# Patient Record
Sex: MALE | Race: WHITE | Employment: OTHER | URBAN - METROPOLITAN AREA
[De-identification: names, ages, dates, MRNs, and addresses within clinical notes are randomized per-mention and may not be internally consistent; named-entity substitution may affect disease eponyms.]

---

## 2017-02-18 DIAGNOSIS — E78.5 HYPERLIPIDEMIA LDL GOAL <130: ICD-10-CM

## 2017-02-19 RX ORDER — SIMVASTATIN 20 MG/1
TABLET, FILM COATED ORAL
Qty: 90 TAB | Refills: 1 | Status: SHIPPED | OUTPATIENT
Start: 2017-02-19 | End: 2017-08-30 | Stop reason: SDUPTHER

## 2017-08-28 ENCOUNTER — TELEPHONE (OUTPATIENT)
Dept: FAMILY MEDICINE CLINIC | Age: 47
End: 2017-08-28

## 2017-08-28 NOTE — TELEPHONE ENCOUNTER
Rush Pedersen [facility]  [phone]    Patient's wife, Rush Pedersen, states that her  has been having shooting pains in his left arm. She states that this has been going on for months. Initially, they just thought the pain was due to him working out a lot but now it is more concerning. Rush Pedersen also states that her  has been having a lot of issues with anxiety and she feels that he needs to talk to Dr. Pipo Couch. Rush Pedersen declined scheduling with another provider and asked if Dr. Pipo Couch could see him any sooner than October?

## 2017-08-30 ENCOUNTER — OFFICE VISIT (OUTPATIENT)
Dept: FAMILY MEDICINE CLINIC | Age: 47
End: 2017-08-30

## 2017-08-30 VITALS
DIASTOLIC BLOOD PRESSURE: 106 MMHG | TEMPERATURE: 98.4 F | WEIGHT: 152 LBS | HEIGHT: 66 IN | BODY MASS INDEX: 24.43 KG/M2 | HEART RATE: 75 BPM | SYSTOLIC BLOOD PRESSURE: 132 MMHG | RESPIRATION RATE: 16 BRPM | OXYGEN SATURATION: 98 %

## 2017-08-30 DIAGNOSIS — R53.81 MALAISE AND FATIGUE: ICD-10-CM

## 2017-08-30 DIAGNOSIS — M25.511 ACUTE PAIN OF BOTH SHOULDERS: ICD-10-CM

## 2017-08-30 DIAGNOSIS — R53.83 MALAISE AND FATIGUE: ICD-10-CM

## 2017-08-30 DIAGNOSIS — E78.5 HYPERLIPIDEMIA LDL GOAL <130: ICD-10-CM

## 2017-08-30 DIAGNOSIS — F41.9 ANXIETY: Primary | ICD-10-CM

## 2017-08-30 DIAGNOSIS — M25.512 ACUTE PAIN OF BOTH SHOULDERS: ICD-10-CM

## 2017-08-30 DIAGNOSIS — R03.0 ELEVATED BLOOD PRESSURE READING: ICD-10-CM

## 2017-08-30 RX ORDER — ESCITALOPRAM OXALATE 10 MG/1
10 TABLET ORAL DAILY
Qty: 30 TAB | Refills: 5 | Status: SHIPPED | OUTPATIENT
Start: 2017-08-30 | End: 2018-03-09 | Stop reason: SDUPTHER

## 2017-08-30 RX ORDER — ALPRAZOLAM 0.25 MG/1
0.25 TABLET ORAL
Qty: 20 TAB | Refills: 0 | Status: SHIPPED | OUTPATIENT
Start: 2017-08-30 | End: 2017-12-06 | Stop reason: ALTCHOICE

## 2017-08-30 NOTE — PROGRESS NOTES
Chief Complaint   Patient presents with    Shoulder Pain     bilateral, sometimes sharp radiates to arm no numbness or tingling    Anxiety     new onset     Not sleeping, cannot relax, denies hyperventilating (panic attack last week x 2-3 hours), unexplained emotions  Took a xanax did not work-minimal relief    DEEPAK-7 score: 14    Called in rx xanax local pharmacy

## 2017-08-30 NOTE — PROGRESS NOTES
HISTORY OF PRESENT ILLNESS  Darryl Chaparro is a 49 y.o. male. Blood pressure (!) 132/106, pulse 75, temperature 98.4 °F (36.9 °C), temperature source Oral, resp. rate 16, height 5' 6\" (1.676 m), weight 152 lb (68.9 kg), SpO2 98 %. Body mass index is 24.53 kg/(m^2). Chief Complaint   Patient presents with    Shoulder Pain     bilateral, sometimes sharp radiates to arm no numbness or tingling    Anxiety     new onset        HPI  Darryl Chaparro 49 y.o. male  presents to the office today for follow up on anxiety. Elevated Blood Pressure: Bp at office today is 142/92 on manual arm cuff recheck. Pt states that he has been having some anxiety issues and thinks that that is causing his bp to be high in office. Anxiety: Pt states that work is going well, but when he gets home he wants to relax, but then gets figidity. Pt went on vacation recently and pt thinks that this aggravated his anxiety. Pt's wife reports that pt called her and said he felt like he was going to cry and could not sit still at night. Pt states that he took a Xanax that night and it provided some relief. Pt states that he is constantly thinking. Pt states that he feels better when someone is watching him and his family. Pt states that after the vacation he felt like he was not going to catch up on his work. Wife reports family history of panic attacks and anxiety. I advised pt that if it is intermittent episodes then it could be panic attacks, but if it is a constant thing then is could be anxiety. I advised pt that there are some daily medications he could take to help alleviate the anxiety. I advised pt that I wanted to get more labs to rule out any thyroid abnormalities. Pt's wife reports that pt's mother had thyroid problems. Wife reports that pt's brother has used Lexapro with relief. I advised pt that I will start him on low doses of Lexapro daily and Xanax PRN.        Bilateral Shoulder Pain: Pt reports that he has had bilateral shoulder pain for the past 6 months. Denies cp, sob, sweats, or radiation. Pt describes the pain as a shooting pain. Pt reports that the pain is worse on the left than the right. I advised pt to see Dr. Thomes Ahumada (Sports Medicine) to rule out acromialclavicular arthritis or anything abnormal. I will get an xray of the shoulders today to rule out arthritis. Right lower back/ hip: Pt reports that he feels something in his rt hip/ lower back area that sometimes causes pain to radiate down his RLE. I advised pt that it could be benign adipose tumor that can be pinching a nerve that is causing the radiating pain down his leg. Hyperlipidemia: Lipid panel on 07/07/16 notable for total cholesterol 256, , HDL 56, and triglycerides 106. Pt continues with Zocor. Wife reports that pt has been taking his medication regularly. I advised pt that he needs to get his lipids rechecked. Current Outpatient Prescriptions   Medication Sig Dispense Refill    escitalopram oxalate (LEXAPRO) 10 mg tablet Take 1 Tab by mouth daily. 30 Tab 5    ALPRAZolam (XANAX) 0.25 mg tablet Take 1 Tab by mouth daily as needed for Anxiety. 20 Tab 0    simvastatin (ZOCOR) 20 mg tablet TAKE 1 TABLET BY MOUTH NIGHTLY 90 Tab 1    diphenhydrAMINE (BENADRYL) 25 mg capsule Take 25 mg by mouth every six (6) hours as needed.  multivitamin (ONE A DAY) tablet Take 1 Tab by mouth daily.  desoximetasone (TOPICORT) 0.05 % topical cream Apply  to affected area two (2) times a day.  60 g 0     Allergies   Allergen Reactions    Shellfish Derived Swelling     Lips swell     Past Medical History:   Diagnosis Date    Anxiety     Elevated cholesterol 4/5/2010    Migraine 4/5/2010    Right inguinal hernia 12/7/2014     Past Surgical History:   Procedure Laterality Date    ENDOSCOPY, COLON, DIAGNOSTIC  4/06 repeat in 5 years    Dr. Pj Alonso     Family History   Problem Relation Age of Onset    Cancer Father      colon     Social History Substance Use Topics    Smoking status: Former Smoker     Packs/day: 0.50     Years: 2.00     Quit date: 4/26/1993    Smokeless tobacco: Never Used    Alcohol use 2.4 oz/week     4 Cans of beer per week        Review of Systems   Constitutional: Negative. Negative for malaise/fatigue. Eyes: Negative for blurred vision. Respiratory: Negative for shortness of breath. Cardiovascular: Negative for chest pain and leg swelling. Musculoskeletal: Positive for joint pain (bilateral shoulder pain). Neurological: Negative. Negative for dizziness and headaches. Psychiatric/Behavioral: The patient is nervous/anxious. All other systems reviewed and are negative. Physical Exam   Constitutional: He is oriented to person, place, and time. He appears well-developed and well-nourished. HENT:   Head: Normocephalic and atraumatic. Neck: Carotid bruit is not present. Cardiovascular: Normal rate, regular rhythm, normal heart sounds and intact distal pulses. Exam reveals no gallop and no friction rub. No murmur heard. Pulmonary/Chest: Effort normal and breath sounds normal. No respiratory distress. He has no wheezes. He has no rales. He exhibits no tenderness. Neurological: He is alert and oriented to person, place, and time. Nursing note and vitals reviewed. ASSESSMENT and PLAN  Diagnoses and all orders for this visit:    1. Anxiety  -     escitalopram oxalate (LEXAPRO) 10 mg tablet; Take 1 Tab by mouth daily.  -     ALPRAZolam (XANAX) 0.25 mg tablet; Take 1 Tab by mouth daily as needed for Anxiety. - I advised pt that I will start him on Lexapro 10mg daily, but not for the long term. - I advised pt to also take Xanax 0.25mg PRN. 2. Acute pain of both shoulders  -     XR SHOULDER RT AP/LAT MIN 2 V; Future  -     XR SHOULDER LT AP/LAT MIN 2 V; Future  - I will get xray of both shoulders to rule out arthritis.      3. Elevated blood pressure reading        - I advised pt that if he gets his anxiety under control his blood pressure should go down. 4. Hyperlipidemia LDL goal <226  -     METABOLIC PANEL, COMPREHENSIVE  -     LIPID PANEL  - Pt's last lipid panel was over a year ago. - I advised pt that he needs to get these checked again tomorrow morning after fasting. 5. Malaise and fatigue  -     TSH 3RD GENERATION  -     CBC WITH AUTOMATED DIFF  - Will assess thyroid today to rule out any hormonal causes of anxiety. Follow-up Disposition:  Return in about 1 month (around 9/30/2017) for anxiety. Medication risks/benefits/costs/interactions/alternatives discussed with patient. Advised patient to call back or return to office if symptoms worsen/change/persist.  If patient cannot reach us or should anything more severe/urgent arise he/she should proceed directly to the nearest emergency department. Discussed expected course/resolution/complications of diagnosis in detail with patient. Patient given a written after visit summary which includes her diagnoses, current medications and vitals. Patient expressed understanding with the diagnosis and plan. Written by josi Kahn, as dictated by Kristin Adams M.D.   I have reviewed and agree with the above note and have made corrections where appropriate, Dr. Cari Alonso MD

## 2017-08-30 NOTE — MR AVS SNAPSHOT
Visit Information Date & Time Provider Department Dept. Phone Encounter #  
 8/30/2017  6:45 PM Mattie Goodwin  [facility] [phone] [id number] Follow-up Instructions Return in about 1 month (around 9/30/2017) for anxiety. Upcoming Health Maintenance Date Due INFLUENZA AGE 9 TO ADULT 8/1/2017 DTaP/Tdap/Td series (2 - Td) 4/26/2020 Allergies as of 8/30/2017  Review Complete On: 8/30/2017 By: Liset Nixon LPN Severity Noted Reaction Type Reactions Shellfish Derived  12/01/2014    Swelling Lips swell Current Immunizations  Reviewed on 11/9/2014 Name Date Influenza Vaccine Split 10/4/2011 TDAP Vaccine 4/26/2010 Not reviewed this visit You Were Diagnosed With   
  
 Codes Comments Anxiety    -  Primary ICD-10-CM: F41.9 ICD-9-CM: 300.00 Acute pain of both shoulders     ICD-10-CM: M25.511, M25.512 ICD-9-CM: 719.41 Elevated blood pressure reading     ICD-10-CM: R03.0 ICD-9-CM: 796.2 Hyperlipidemia LDL goal <130     ICD-10-CM: E78.5 ICD-9-CM: 272.4 Malaise and fatigue     ICD-10-CM: R53.81, R53.83 ICD-9-CM: 780.79 Vitals BP Pulse Temp Resp Height(growth percentile) Weight(growth percentile) (!) 132/106 (BP 1 Location: Left arm, BP Patient Position: Sitting) 75 98.4 °F (36.9 °C) (Oral) 16 5' 6\" (1.676 m) 152 lb (68.9 kg) SpO2 BMI Smoking Status 98% 24.53 kg/m2 Former Smoker Vitals History BMI and BSA Data Body Mass Index Body Surface Area 24.53 kg/m 2 1.79 m 2 Preferred Pharmacy Pharmacy Name Phone [id number] Chairez [phone] Your Updated Medication List  
  
   
This list is accurate as of: 8/30/17  8:04 PM.  Always use your most recent med list.  
  
  
  
  
 ALPRAZolam 0.25 mg tablet Commonly known as:  Phylicia Heard Take 1 Tab by mouth daily as needed for Anxiety. BENADRYL 25 mg capsule Generic drug:  diphenhydrAMINE Take 25 mg by mouth every six (6) hours as needed. desoximetasone 0.05 % topical cream  
Commonly known as:  TOPICORT Apply  to affected area two (2) times a day. escitalopram oxalate 10 mg tablet Commonly known as:  Brendan Snow Take 1 Tab by mouth daily. multivitamin tablet Commonly known as:  ONE A DAY Take 1 Tab by mouth daily. simvastatin 20 mg tablet Commonly known as:  ZOCOR  
TAKE 1 TABLET BY MOUTH NIGHTLY Prescriptions Printed Refills ALPRAZolam (XANAX) 0.25 mg tablet 0 Sig: Take 1 Tab by mouth daily as needed for Anxiety. Class: Print Route: Oral  
  
Prescriptions Sent to Pharmacy Refills  
 escitalopram oxalate (LEXAPRO) 10 mg tablet 5 Sig: Take 1 Tab by mouth daily. Class: Normal  
 [facility]: [facility] #: [phone] Route: Oral  
  
We Performed the Following CBC WITH AUTOMATED DIFF [39334 CPT(R)] LIPID PANEL [65677 CPT(R)] METABOLIC PANEL, COMPREHENSIVE [28199 CPT(R)] TSH 3RD GENERATION [56984 CPT(R)] Follow-up Instructions Return in about 1 month (around 9/30/2017) for anxiety. To-Do List   
 08/30/2017 Imaging:  XR SHOULDER LT AP/LAT MIN 2 V   
  
 08/30/2017 Imaging:  XR SHOULDER RT AP/LAT MIN 2 V   
  
 08/30/2017 8:05 PM  
  Appointment with [facility] RAD XR RM 1 at [address] [place] [address] ([phone]) Introducing [facility]! [facility] introduces [employer] patient portal. Now you can access parts of your medical record, email your doctor's office, and request medication refills online. 1. In your internet browser, go to [email or web address] [facility] 2. Click on the First Time User? Click Here link in the Sign In box.  You will see the New Member Sign Up page. 3. Enter your [employer] Access Code exactly as it appears below. You will not need to use this code after youve completed the sign-up process. If you do not sign up before the expiration date, you must request a new code. · [employer] Access Code: [email or web address] Expires: 11/28/2017  8:04 PM 
 
4. Enter the last four digits of your Social Security Number (xxxx) and Date of Birth (mm/dd/yyyy) as indicated and click Submit. You will be taken to the next sign-up page. 5. Create a [employer] ID. This will be your [employer] login ID and cannot be changed, so think of one that is secure and easy to remember. 6. Create a [employer] password. You can change your password at any time. 7. Enter your Password Reset Question and Answer. This can be used at a later time if you forget your password. 8. Enter your e-mail address. You will receive e-mail notification when new information is available in [address]. 9. Click Sign Up. You can now view and download portions of your medical record. 10. Click the Download Summary menu link to download a portable copy of your medical information. If you have questions, please visit the Frequently Asked Questions section of the [employer] website. Remember, [employer] is NOT to be used for urgent needs. For medical emergencies, dial 911. Now available from your iPhone and Android! Please provide this summary of care documentation to your next provider. Your primary care clinician is listed as Domingo Bo. If you have any questions after today's visit, please call [phone].

## 2017-08-31 LAB
ALBUMIN SERPL-MCNC: 4.5 G/DL (ref 3.5–5.5)
ALBUMIN/GLOB SERPL: 1.6 {RATIO} (ref 1.2–2.2)
ALP SERPL-CCNC: 56 IU/L (ref 39–117)
ALT SERPL-CCNC: 29 IU/L (ref 0–44)
AST SERPL-CCNC: 27 IU/L (ref 0–40)
BASOPHILS # BLD AUTO: 0 X10E3/UL (ref 0–0.2)
BASOPHILS NFR BLD AUTO: 0 %
BILIRUB SERPL-MCNC: 0.2 MG/DL (ref 0–1.2)
BUN SERPL-MCNC: 18 MG/DL (ref 6–24)
BUN/CREAT SERPL: 19 (ref 9–20)
CALCIUM SERPL-MCNC: 9.5 MG/DL (ref 8.7–10.2)
CHLORIDE SERPL-SCNC: 99 MMOL/L (ref 96–106)
CHOLEST SERPL-MCNC: 176 MG/DL (ref 100–199)
CO2 SERPL-SCNC: 26 MMOL/L (ref 18–29)
CREAT SERPL-MCNC: 0.97 MG/DL (ref 0.76–1.27)
EOSINOPHIL # BLD AUTO: 0.3 X10E3/UL (ref 0–0.4)
EOSINOPHIL NFR BLD AUTO: 4 %
ERYTHROCYTE [DISTWIDTH] IN BLOOD BY AUTOMATED COUNT: 12.5 % (ref 12.3–15.4)
GLOBULIN SER CALC-MCNC: 2.9 G/DL (ref 1.5–4.5)
GLUCOSE SERPL-MCNC: 77 MG/DL (ref 65–99)
HCT VFR BLD AUTO: 43.1 % (ref 37.5–51)
HDLC SERPL-MCNC: 41 MG/DL
HGB BLD-MCNC: 14.7 G/DL (ref 12.6–17.7)
IMM GRANULOCYTES # BLD: 0 X10E3/UL (ref 0–0.1)
IMM GRANULOCYTES NFR BLD: 0 %
INTERPRETATION, 910389: NORMAL
LDLC SERPL CALC-MCNC: 79 MG/DL (ref 0–99)
LYMPHOCYTES # BLD AUTO: 2.8 X10E3/UL (ref 0.7–3.1)
LYMPHOCYTES NFR BLD AUTO: 30 %
MCH RBC QN AUTO: 30.9 PG (ref 26.6–33)
MCHC RBC AUTO-ENTMCNC: 34.1 G/DL (ref 31.5–35.7)
MCV RBC AUTO: 91 FL (ref 79–97)
MONOCYTES # BLD AUTO: 0.9 X10E3/UL (ref 0.1–0.9)
MONOCYTES NFR BLD AUTO: 9 %
NEUTROPHILS # BLD AUTO: 5.4 X10E3/UL (ref 1.4–7)
NEUTROPHILS NFR BLD AUTO: 57 %
PLATELET # BLD AUTO: 282 X10E3/UL (ref 150–379)
POTASSIUM SERPL-SCNC: 4.2 MMOL/L (ref 3.5–5.2)
PROT SERPL-MCNC: 7.4 G/DL (ref 6–8.5)
RBC # BLD AUTO: 4.76 X10E6/UL (ref 4.14–5.8)
SODIUM SERPL-SCNC: 142 MMOL/L (ref 134–144)
TRIGL SERPL-MCNC: 278 MG/DL (ref 0–149)
TSH SERPL DL<=0.005 MIU/L-ACNC: 5.53 UIU/ML (ref 0.45–4.5)
VLDLC SERPL CALC-MCNC: 56 MG/DL (ref 5–40)
WBC # BLD AUTO: 9.5 X10E3/UL (ref 3.4–10.8)

## 2017-09-04 RX ORDER — SIMVASTATIN 20 MG/1
TABLET, FILM COATED ORAL
Qty: 90 TAB | Refills: 1 | Status: SHIPPED | OUTPATIENT
Start: 2017-09-04 | End: 2018-03-08 | Stop reason: SDUPTHER

## 2017-09-15 ENCOUNTER — TELEPHONE (OUTPATIENT)
Dept: FAMILY MEDICINE CLINIC | Age: 47
End: 2017-09-15

## 2017-09-15 NOTE — TELEPHONE ENCOUNTER
Patients wife Lesley Horton is calling in regards to a missed call from [place] [facility], tried contacting nurses, no success.   Best call back # for [phone]

## 2017-09-25 NOTE — PROGRESS NOTES
We need to recheck TSH next week  Place order  And Dx malaise. Pt was slightly hypothyroid but need to confirm. Thyroid indicates elevated triglyderides need to work on diet.     Please inform about importance about getting glabs next week

## 2017-09-26 NOTE — PROGRESS NOTES
[phone] ([place]) attempted to call patient no answer left message to call us back in regards to his labs

## 2017-10-06 NOTE — PROGRESS NOTES
[phone] (M) attempted to call patient no answer left message to call me back in regards to his labs

## 2017-10-09 NOTE — PROGRESS NOTES
Mail letter to patient  The results of your lab work performed in our office were abnormal and we have had difficulty reaching you by telephone. Please contact our office as soon as possible to discuss these results.

## 2017-11-29 ENCOUNTER — TELEPHONE (OUTPATIENT)
Dept: FAMILY MEDICINE CLINIC | Age: 47
End: 2017-11-29

## 2017-11-29 NOTE — TELEPHONE ENCOUNTER
----- Message from Leticia Davila Dr sent at 11/29/2017 10:27 AM EST -----  Regarding: Dr. Shay Finn / Telephone  Contact: [phone]  Pt wife, Terrance Chaparro needs to make an appt for pt before January 2018. No appts are available for medication check up. Please contact them back.

## 2017-12-05 ENCOUNTER — TELEPHONE (OUTPATIENT)
Dept: FAMILY MEDICINE CLINIC | Age: 47
End: 2017-12-05

## 2017-12-05 DIAGNOSIS — Z12.11 COLON CANCER SCREENING: Primary | ICD-10-CM

## 2017-12-05 NOTE — TELEPHONE ENCOUNTER
Patient says she called Friday 12/1/17 and has not received a call back regarding her request. She is requesting a recommendation for a GI doctor.  Would like to a call back regarding this request.     Best contact number for patient: [phone]  12/5/17  [facility]

## 2017-12-06 ENCOUNTER — OFFICE VISIT (OUTPATIENT)
Dept: FAMILY MEDICINE CLINIC | Age: 47
End: 2017-12-06

## 2017-12-06 VITALS
DIASTOLIC BLOOD PRESSURE: 105 MMHG | RESPIRATION RATE: 18 BRPM | HEART RATE: 67 BPM | TEMPERATURE: 99.6 F | OXYGEN SATURATION: 100 % | BODY MASS INDEX: 22.73 KG/M2 | SYSTOLIC BLOOD PRESSURE: 157 MMHG | WEIGHT: 141.4 LBS | HEIGHT: 66 IN

## 2017-12-06 DIAGNOSIS — K21.9 GASTROESOPHAGEAL REFLUX DISEASE WITHOUT ESOPHAGITIS: Primary | ICD-10-CM

## 2017-12-06 DIAGNOSIS — Z13.29 THYROID DISORDER SCREEN: ICD-10-CM

## 2017-12-06 DIAGNOSIS — R53.81 MALAISE AND FATIGUE: ICD-10-CM

## 2017-12-06 DIAGNOSIS — I10 HYPERTENSION, UNSPECIFIED TYPE: ICD-10-CM

## 2017-12-06 DIAGNOSIS — E78.5 HYPERLIPIDEMIA LDL GOAL <130: ICD-10-CM

## 2017-12-06 DIAGNOSIS — F41.9 ANXIETY: ICD-10-CM

## 2017-12-06 DIAGNOSIS — R53.83 MALAISE AND FATIGUE: ICD-10-CM

## 2017-12-06 RX ORDER — CLONAZEPAM 1 MG/1
1 TABLET ORAL
Qty: 20 TAB | Refills: 0 | Status: SHIPPED | OUTPATIENT
Start: 2017-12-06 | End: 2017-12-20 | Stop reason: ALTCHOICE

## 2017-12-06 RX ORDER — PANTOPRAZOLE SODIUM 40 MG/1
40 TABLET, DELAYED RELEASE ORAL DAILY
Qty: 30 TAB | Refills: 5 | Status: SHIPPED | OUTPATIENT
Start: 2017-12-06 | End: 2018-02-10 | Stop reason: SDUPTHER

## 2017-12-06 RX ORDER — FAMOTIDINE 20 MG/1
TABLET, FILM COATED ORAL
COMMUNITY
Start: 2017-12-03 | End: 2017-12-06 | Stop reason: ALTCHOICE

## 2017-12-06 NOTE — PROGRESS NOTES
Chief Complaint   Patient presents with    Medication Evaluation   1. Have you been to the ER, urgent care clinic since your last visit? Hospitalized since your last visit? Yes When: [address], 11/2/17 panic attack   11/1/17 [facility], panic attack   2. Have you seen or consulted any other health care providers outside of the [address] [place] [address] since your last visit? Include any pap smears or colon screening. No      Patient presents for OV today,. Following urgent care and ED visit of weekend.  Had c/o over weekend of nausea, stomach burning, dx of panic attacks

## 2017-12-06 NOTE — MR AVS SNAPSHOT
Visit Information Date & Time Provider Department Dept. Phone Encounter #  
 12/6/2017  6:45 PM Wilbur Bell MD [address] [place] [address] [phone] [id number] Follow-up Instructions Return in about 2 weeks (around 12/20/2017) for hypertension follow up. Upcoming Health Maintenance Date Due DTaP/Tdap/Td series (2 - Td) 4/26/2020 Allergies as of 12/6/2017  Review Complete On: 12/6/2017 By: Wilbur Bell MD  
  
 Severity Noted Reaction Type Reactions Shellfish Derived  12/01/2014    Swelling Lips swell Current Immunizations  Reviewed on 12/6/2017 Name Date Influenza Vaccine 10/23/2017 Influenza Vaccine Split 10/4/2011 TDAP Vaccine 4/26/2010 Reviewed by Wilbur Bell MD on 12/6/2017 at  7:43 PM  
You Were Diagnosed With   
  
 Codes Comments Gastroesophageal reflux disease without esophagitis    -  Primary ICD-10-CM: K21.9 ICD-9-CM: 530.81 Hyperlipidemia LDL goal <130     ICD-10-CM: E78.5 ICD-9-CM: 272.4 Hypertension, unspecified type     ICD-10-CM: I10 
ICD-9-CM: 401.9 Thyroid disorder screen     ICD-10-CM: Z13.29 ICD-9-CM: V77.0 Anxiety     ICD-10-CM: F41.9 ICD-9-CM: 300.00 Malaise and fatigue     ICD-10-CM: R53.81, R53.83 ICD-9-CM: 780.79 Vitals BP Pulse Temp Resp Height(growth percentile) Weight(growth percentile) (!) 157/105 (BP 1 Location: Right arm, BP Patient Position: Sitting) 67 99.6 °F (37.6 °C) (Oral) 18 5' 6\" (1.676 m) 141 lb 6.4 oz (64.1 kg) SpO2 BMI Smoking Status 100% 22.82 kg/m2 Former Smoker Vitals History BMI and BSA Data Body Mass Index Body Surface Area  
 22.82 kg/m 2 1.73 m 2 Preferred Pharmacy Pharmacy Name Phone [id number], [address] [phone] Your Updated Medication List  
  
   
 This list is accurate as of: 12/6/17  8:25 PM.  Always use your most recent med list.  
  
  
  
  
 BENADRYL 25 mg capsule Generic drug:  diphenhydrAMINE Take 25 mg by mouth every six (6) hours as needed. clonazePAM 1 mg tablet Commonly known as:  Mohan Brocks Take 1 Tab by mouth daily as needed. escitalopram oxalate 10 mg tablet Commonly known as:  Graciela Skates Take 1 Tab by mouth daily. FLUCELVAX QUAD 4476-8000 (PF) Syrg injection Generic drug:  influenza vaccine 2017-18 (4 yrs+)(PF)  
INJECT 0.5ML INTRAMUSCULARLY ONCE  
  
 multivitamin tablet Commonly known as:  ONE A DAY Take 1 Tab by mouth daily. pantoprazole 40 mg tablet Commonly known as:  PROTONIX Take 1 Tab by mouth daily. simvastatin 20 mg tablet Commonly known as:  ZOCOR  
TAKE 1 TABLET BY MOUTH NIGHTLY Prescriptions Printed Refills  
 clonazePAM (KLONOPIN) 1 mg tablet 0 Sig: Take 1 Tab by mouth daily as needed. Class: Print Route: Oral  
  
Prescriptions Sent to Pharmacy Refills  
 pantoprazole (PROTONIX) 40 mg tablet 5 Sig: Take 1 Tab by mouth daily. Class: Normal  
 Pharmacy: [facility] #: [phone] Route: Oral  
  
We Performed the Following CBC WITH AUTOMATED DIFF [43261 CPT(R)] LIPID PANEL [23123 CPT(R)] T4, FREE [id number] CPT(R)] TSH 3RD GENERATION [83516 CPT(R)] Follow-up Instructions Return in about 2 weeks (around 12/20/2017) for hypertension follow up. Patient Instructions Anxiety Disorder: Care Instructions Your Care Instructions Anxiety is a normal reaction to stress. Difficult situations can cause you to have symptoms such as sweaty palms and a nervous feeling. In an anxiety disorder, the symptoms are far more severe.  Constant worry, muscle tension, trouble sleeping, nausea and diarrhea, and other symptoms can make normal daily activities difficult or impossible. These symptoms may occur for no reason, and they can affect your work, school, or social life. Medicines, counseling, and self-care can all help. Follow-up care is a key part of your treatment and safety. Be sure to make and go to all appointments, and call your doctor if you are having problems. It's also a good idea to know your test results and keep a list of the medicines you take. How can you care for yourself at home? · Take medicines exactly as directed. Call your doctor if you think you are having a problem with your medicine. · Go to your counseling sessions and follow-up appointments. · Recognize and accept your anxiety. Then, when you are in a situation that makes you anxious, say to yourself, \"This is not an emergency. I feel uncomfortable, but I am not in danger. I can keep going even if I feel anxious. \" · Be kind to your body: ¨ Relieve tension with exercise or a massage. ¨ Get enough rest. 
¨ Avoid alcohol, caffeine, nicotine, and illegal drugs. They can increase your anxiety level and cause sleep problems. ¨ Learn and do relaxation techniques. See below for more about these techniques. · Engage your mind. Get out and do something you enjoy. Go to a funny movie, or take a walk or hike. Plan your day. Having too much or too little to do can make you anxious. · Keep a record of your symptoms. Discuss your fears with a good friend or family member, or join a support group for people with similar problems. Talking to others sometimes relieves stress. · Get involved in social groups, or volunteer to help others. Being alone sometimes makes things seem worse than they are. · Get at least 30 minutes of exercise on most days of the week to relieve stress. Walking is a good choice. You also may want to do other activities, such as running, swimming, cycling, or playing tennis or team sports. Relaxation techniques Do relaxation exercises 10 to 20 minutes a day. You can play soothing, relaxing music while you do them, if you wish. · Tell others in your house that you are going to do your relaxation exercises. Ask them not to disturb you. · Find a comfortable place, away from all distractions and noise. · Lie down on your back, or sit with your back straight. · Focus on your breathing. Make it slow and steady. · Breathe in through your nose. Breathe out through either your nose or mouth. · Breathe deeply, filling up the area between your navel and your rib cage. Breathe so that your belly goes up and down. · Do not hold your breath. · Breathe like this for 5 to 10 minutes. Notice the feeling of calmness throughout your whole body. As you continue to breathe slowly and deeply, relax by doing the following for another 5 to 10 minutes: · Tighten and relax each muscle group in your body. You can begin at your toes and work your way up to your head. · Imagine your muscle groups relaxing and becoming heavy. · Empty your mind of all thoughts. · Let yourself relax more and more deeply. · Become aware of the state of calmness that surrounds you. · When your relaxation time is over, you can bring yourself back to alertness by moving your fingers and toes and then your hands and feet and then stretching and moving your entire body. Sometimes people fall asleep during relaxation, but they usually wake up shortly afterward. · Always give yourself time to return to full alertness before you drive a car or do anything that might cause an accident if you are not fully alert. Never play a relaxation tape while you drive a car. When should you call for help? Call 911 anytime you think you may need emergency care. For example, call if: 
? · You feel you cannot stop from hurting yourself or someone else. ? Keep the numbers for these national suicide hotlines: 3-163-821-TALK (4-750.704.5573) and 5-782-RURBAGO (6-632.961.8885). If you or someone you know talks about suicide or feeling hopeless, get help right away. ? Watch closely for changes in your health, and be sure to contact your doctor if: 
? · You have anxiety or fear that affects your life. ? · You have symptoms of anxiety that are new or different from those you had before. Where can you learn more? Go to http://tricia-grupo.info/. Enter P754 in the search box to learn more about \"Anxiety Disorder: Care Instructions. \" Current as of: May 12, 2017 Content Version: 11.4 © 3572-5618 [employer]. Care instructions adapted under license by [employer] (which disclaims liability or warranty for this information). If you have questions about a medical condition or this instruction, always ask your healthcare professional. [address] any warranty or liability for your use of this information. Introducing [facility]! Dear Artur Summers: Thank you for requesting a zhiwo account. Our records indicate that you already have an active zhiwo account. You can access your account anytime at [email or web address] Did you know that you can access your hospital and ER discharge instructions at any time in zhiwo? You can also review all of your test results from your hospital stay or ER visit. Additional Information If you have questions, please visit the Frequently Asked Questions section of the zhiwo website at [email or web address] Remember, zhiwo is NOT to be used for urgent needs. For medical emergencies, dial 911. Now available from your iPhone and Android! Please provide this summary of care documentation to your next provider. Your primary care clinician is listed as Jyoti Little. If you have any questions after today's visit, please call [phone].

## 2017-12-07 NOTE — PATIENT INSTRUCTIONS
Anxiety Disorder: Care Instructions  Your Care Instructions    Anxiety is a normal reaction to stress. Difficult situations can cause you to have symptoms such as sweaty palms and a nervous feeling. In an anxiety disorder, the symptoms are far more severe. Constant worry, muscle tension, trouble sleeping, nausea and diarrhea, and other symptoms can make normal daily activities difficult or impossible. These symptoms may occur for no reason, and they can affect your work, school, or social life. Medicines, counseling, and self-care can all help. Follow-up care is a key part of your treatment and safety. Be sure to make and go to all appointments, and call your doctor if you are having problems. It's also a good idea to know your test results and keep a list of the medicines you take. How can you care for yourself at home? · Take medicines exactly as directed. Call your doctor if you think you are having a problem with your medicine. · Go to your counseling sessions and follow-up appointments. · Recognize and accept your anxiety. Then, when you are in a situation that makes you anxious, say to yourself, \"This is not an emergency. I feel uncomfortable, but I am not in danger. I can keep going even if I feel anxious. \"  · Be kind to your body:  ¨ Relieve tension with exercise or a massage. ¨ Get enough rest.  ¨ Avoid alcohol, caffeine, nicotine, and illegal drugs. They can increase your anxiety level and cause sleep problems. ¨ Learn and do relaxation techniques. See below for more about these techniques. · Engage your mind. Get out and do something you enjoy. Go to a funny movie, or take a walk or hike. Plan your day. Having too much or too little to do can make you anxious. · Keep a record of your symptoms. Discuss your fears with a good friend or family member, or join a support group for people with similar problems. Talking to others sometimes relieves stress.   · Get involved in social groups, or volunteer to help others. Being alone sometimes makes things seem worse than they are. · Get at least 30 minutes of exercise on most days of the week to relieve stress. Walking is a good choice. You also may want to do other activities, such as running, swimming, cycling, or playing tennis or team sports. Relaxation techniques  Do relaxation exercises 10 to 20 minutes a day. You can play soothing, relaxing music while you do them, if you wish. · Tell others in your house that you are going to do your relaxation exercises. Ask them not to disturb you. · Find a comfortable place, away from all distractions and noise. · Lie down on your back, or sit with your back straight. · Focus on your breathing. Make it slow and steady. · Breathe in through your nose. Breathe out through either your nose or mouth. · Breathe deeply, filling up the area between your navel and your rib cage. Breathe so that your belly goes up and down. · Do not hold your breath. · Breathe like this for 5 to 10 minutes. Notice the feeling of calmness throughout your whole body. As you continue to breathe slowly and deeply, relax by doing the following for another 5 to 10 minutes:  · Tighten and relax each muscle group in your body. You can begin at your toes and work your way up to your head. · Imagine your muscle groups relaxing and becoming heavy. · Empty your mind of all thoughts. · Let yourself relax more and more deeply. · Become aware of the state of calmness that surrounds you. · When your relaxation time is over, you can bring yourself back to alertness by moving your fingers and toes and then your hands and feet and then stretching and moving your entire body. Sometimes people fall asleep during relaxation, but they usually wake up shortly afterward. · Always give yourself time to return to full alertness before you drive a car or do anything that might cause an accident if you are not fully alert.  Never play a relaxation tape while you drive a car. When should you call for help? Call 911 anytime you think you may need emergency care. For example, call if:  ? · You feel you cannot stop from hurting yourself or someone else. ? Keep the numbers for these national suicide hotlines: 4-243-946-TALK (8-663.319.5312) and 2-668-OLIKTIC (8-531.975.6163). If you or someone you know talks about suicide or feeling hopeless, get help right away. ? Watch closely for changes in your health, and be sure to contact your doctor if:  ? · You have anxiety or fear that affects your life. ? · You have symptoms of anxiety that are new or different from those you had before. Where can you learn more? Go to http://tricia-grupo.info/. Enter P754 in the search box to learn more about \"Anxiety Disorder: Care Instructions. \"  Current as of: May 12, 2017  Content Version: 11.4  © 8951-1147 [employer]. Care instructions adapted under license by [employer] (which disclaims liability or warranty for this information). If you have questions about a medical condition or this instruction, always ask your healthcare professional. [address] any warranty or liability for your use of this information.

## 2017-12-07 NOTE — PROGRESS NOTES
HISTORY OF PRESENT ILLNESS  Jean-Paul Chaparro is a 49 y.o. male. Blood pressure (!) 157/105, pulse 67, temperature 99.6 °F (37.6 °C), temperature source Oral, resp. rate 18, height 5' 6\" (1.676 m), weight 141 lb 6.4 oz (64.1 kg), SpO2 100 %. Body mass index is 22.82 kg/(m^2). Chief Complaint   Patient presents with    Medication Evaluation        HPI  Jean-Paul Chaparro 49 y.o. male  presents to the office today for medication evaluation. Pt presents with wife at bedside. Elevated blood pressure: Pt's BP is 157/105 by manual arm cuff recheck. Pt's wife reports that pt has been having slightly high blood pressure for the past week, but they think it is likely due to his anxiety. Advised pt to take his blood pressure at home and to keep a log of it. Hyperlipidemia: Lipid panel on 08/30/17 notable for total cholesterol 176, LDL 79, HDL 41, and triglycerides 278. Pt continues with Simvastatin 20mg daily. Presumed stable, will assess levels tomorrow when pt returns for fasting blood work. Anxiety: Pt's wife reports that pt was seen in [facility] 11/30/17 and went to [facility] on 12/01/17 for a panic attack. She thinks that pt's Lexapro is not at a high enough dosage. Pt's wife notes that when pt thinks he feels nauseous he starts to have his panic attacks. She also notes that when pt was in the ED he was given Ativan. Advised pt that I will give him Klonopin to take PRN instead of the Xanax. Discussed with pt that I think it is important for him to see a Psychologist to talk about his stress. , will refer pt to Dr. Allyn Barthel (Psychology). Elevated TSH: Pt's TSH levels were 5.530 on 08/30/17. Pt is not currently taking any medication. Pt's wife reports that pt has a history of hypothyroidism. Discussed with pt the long term implications of not treating hypothyroidism. Will reassess pt's TSH tomorrow when he returns for blood work.      Health maintenance:  Pt's wife states that they have an appointment with Dr. Matthias Lundberg (GI) in February for a colonoscopy and EGD. I will start pt on Protonix for his heartburn. Current Outpatient Prescriptions   Medication Sig Dispense Refill    pantoprazole (PROTONIX) 40 mg tablet Take 1 Tab by mouth daily. 30 Tab 5    clonazePAM (KLONOPIN) 1 mg tablet Take 1 Tab by mouth daily as needed. 20 Tab 0    FLUCELVAX QUAD 2987-6704, PF, syrg injection INJECT 0.5ML INTRAMUSCULARLY ONCE  0    simvastatin (ZOCOR) 20 mg tablet TAKE 1 TABLET BY MOUTH NIGHTLY 90 Tab 1    escitalopram oxalate (LEXAPRO) 10 mg tablet Take 1 Tab by mouth daily. 30 Tab 5    diphenhydrAMINE (BENADRYL) 25 mg capsule Take 25 mg by mouth every six (6) hours as needed.  multivitamin (ONE A DAY) tablet Take 1 Tab by mouth daily. Allergies   Allergen Reactions    Shellfish Derived Swelling     Lips swell     Past Medical History:   Diagnosis Date    Anxiety     Elevated cholesterol 4/5/2010    Migraine 4/5/2010    Right inguinal hernia 12/7/2014     Past Surgical History:   Procedure Laterality Date    ENDOSCOPY, COLON, DIAGNOSTIC  4/06 repeat in 5 years    Dr. Connie Castro     Family History   Problem Relation Age of Onset    Cancer Father      colon     Social History   Substance Use Topics    Smoking status: Former Smoker     Packs/day: 0.50     Years: 2.00     Quit date: 4/26/1993    Smokeless tobacco: Never Used    Alcohol use 2.4 oz/week     4 Cans of beer per week        Review of Systems   Constitutional: Negative. Negative for malaise/fatigue. Eyes: Negative for blurred vision. Respiratory: Negative for shortness of breath. Cardiovascular: Negative for chest pain and leg swelling. Gastrointestinal: Positive for heartburn (intermittent). Musculoskeletal: Negative. Neurological: Negative. Negative for dizziness and headaches. Psychiatric/Behavioral: The patient is nervous/anxious. All other systems reviewed and are negative.       Physical Exam Constitutional: He is oriented to person, place, and time. He appears well-developed and well-nourished. HENT:   Head: Normocephalic and atraumatic. Neck: Carotid bruit is not present. Cardiovascular: Normal rate, regular rhythm, normal heart sounds and intact distal pulses. Exam reveals no gallop and no friction rub. No murmur heard. Pulmonary/Chest: Effort normal and breath sounds normal. No respiratory distress. He has no wheezes. He has no rales. He exhibits no tenderness. Neurological: He is alert and oriented to person, place, and time. Psychiatric: He has a normal mood and affect. His behavior is normal. Judgment and thought content normal.   Nursing note and vitals reviewed. ASSESSMENT and PLAN  Diagnoses and all orders for this visit:    1. Gastroesophageal reflux disease without esophagitis  -     pantoprazole (PROTONIX) 40 mg tablet; Take 1 Tab by mouth daily. - I will start pt on Protonix for his heartburn. 2. Hyperlipidemia LDL goal <130  -     LIPID PANEL  - Lipid panel on 08/30/17 notable for total cholesterol 176, LDL 79, HDL 41, and triglycerides 278. Pt continues with Simvastatin 20mg daily. Presumed stable, will assess levels tomorrow when pt returns for fasting blood work. 3. Hypertension, unspecified type        - Advised pt to take his blood pressure at home and keep a log on [employer]. 4. Thyroid disorder screen  -     TSH 3RD GENERATION  -     T4, FREE  - Pt's TSH levels were 5.530 on 08/30/17. Pt is not currently taking any medication.   - Discussed with pt the long term implications of not treating hypothyroidism.   - Will reassess pt's TSH tomorrow when he returns for blood work. 5. Anxiety  -     clonazePAM (KLONOPIN) 1 mg tablet; Take 1 Tab by mouth daily as needed. - REFERRAL TO PSYCHOLOGY  -  Advised pt that I will give him Klonopin to take PRN instead of the Xanax.    - Discussed with pt that I think it is important for him to see a Psychologist to talk about his stress. Will refer pt to Dr. Nelson Dolan (Psychology). 6. Malaise and fatigue  -     CBC WITH AUTOMATED DIFF  - Presumed stable, will assess levels today. Follow-up Disposition:  Return in about 2 weeks (around 12/20/2017) for hypertension follow up. Medication risks/benefits/costs/interactions/alternatives discussed with patient. Advised patient to call back or return to office if symptoms worsen/change/persist.  If patient cannot reach us or should anything more severe/urgent arise he/she should proceed directly to the nearest emergency department. Discussed expected course/resolution/complications of diagnosis in detail with patient. Patient given a written after visit summary which includes her diagnoses, current medications and vitals. Patient expressed understanding with the diagnosis and plan. Written by josi Lowe, as dictated by Joan Ambrose M.D.   I have reviewed and agree with the above note and have made corrections where appropriate, Dr. Marion Robledo MD

## 2017-12-13 LAB
BASOPHILS # BLD AUTO: 0 X10E3/UL (ref 0–0.2)
BASOPHILS NFR BLD AUTO: 1 %
CHOLEST SERPL-MCNC: 154 MG/DL (ref 100–199)
EOSINOPHIL # BLD AUTO: 0.2 X10E3/UL (ref 0–0.4)
EOSINOPHIL NFR BLD AUTO: 2 %
ERYTHROCYTE [DISTWIDTH] IN BLOOD BY AUTOMATED COUNT: 12.7 % (ref 12.3–15.4)
HCT VFR BLD AUTO: 43.8 % (ref 37.5–51)
HDLC SERPL-MCNC: 45 MG/DL
HGB BLD-MCNC: 14.7 G/DL (ref 13–17.7)
IMM GRANULOCYTES # BLD: 0 X10E3/UL (ref 0–0.1)
IMM GRANULOCYTES NFR BLD: 0 %
INTERPRETATION, 910389: NORMAL
LDLC SERPL CALC-MCNC: 90 MG/DL (ref 0–99)
LYMPHOCYTES # BLD AUTO: 2 X10E3/UL (ref 0.7–3.1)
LYMPHOCYTES NFR BLD AUTO: 30 %
MCH RBC QN AUTO: 31.3 PG (ref 26.6–33)
MCHC RBC AUTO-ENTMCNC: 33.6 G/DL (ref 31.5–35.7)
MCV RBC AUTO: 93 FL (ref 79–97)
MONOCYTES # BLD AUTO: 0.6 X10E3/UL (ref 0.1–0.9)
MONOCYTES NFR BLD AUTO: 9 %
NEUTROPHILS # BLD AUTO: 4 X10E3/UL (ref 1.4–7)
NEUTROPHILS NFR BLD AUTO: 58 %
PLATELET # BLD AUTO: 292 X10E3/UL (ref 150–379)
RBC # BLD AUTO: 4.7 X10E6/UL (ref 4.14–5.8)
T4 FREE SERPL-MCNC: 1.08 NG/DL (ref 0.82–1.77)
TRIGL SERPL-MCNC: 96 MG/DL (ref 0–149)
TSH SERPL DL<=0.005 MIU/L-ACNC: 1.88 UIU/ML (ref 0.45–4.5)
VLDLC SERPL CALC-MCNC: 19 MG/DL (ref 5–40)
WBC # BLD AUTO: 6.8 X10E3/UL (ref 3.4–10.8)

## 2017-12-20 ENCOUNTER — OFFICE VISIT (OUTPATIENT)
Dept: FAMILY MEDICINE CLINIC | Age: 47
End: 2017-12-20

## 2017-12-20 VITALS
HEIGHT: 66 IN | DIASTOLIC BLOOD PRESSURE: 87 MMHG | BODY MASS INDEX: 24.17 KG/M2 | SYSTOLIC BLOOD PRESSURE: 136 MMHG | TEMPERATURE: 98.5 F | WEIGHT: 150.4 LBS | RESPIRATION RATE: 16 BRPM | OXYGEN SATURATION: 99 % | HEART RATE: 65 BPM

## 2017-12-20 DIAGNOSIS — F41.9 ANXIETY: ICD-10-CM

## 2017-12-20 DIAGNOSIS — R03.0 ELEVATED BLOOD PRESSURE READING: Primary | ICD-10-CM

## 2017-12-20 DIAGNOSIS — E78.5 HYPERLIPIDEMIA LDL GOAL <130: ICD-10-CM

## 2017-12-20 RX ORDER — ALPRAZOLAM 0.5 MG/1
0.5 TABLET ORAL
Qty: 30 TAB | Refills: 0 | Status: SHIPPED | OUTPATIENT
Start: 2017-12-20 | End: 2018-06-20 | Stop reason: ALTCHOICE

## 2017-12-20 NOTE — MR AVS SNAPSHOT
Visit Information Date & Time Provider Department Dept. Phone Encounter #  
 12/20/2017  5:45 PM Joan Ambrose MD [address] [place] [address] [phone] [id number] Follow-up Instructions Return in about 3 months (around 3/20/2018) for anxiety. Upcoming Health Maintenance Date Due DTaP/Tdap/Td series (2 - Td) 4/26/2020 Allergies as of 12/20/2017  Review Complete On: 12/20/2017 By: Joan Ambrose MD  
  
 Severity Noted Reaction Type Reactions Shellfish Derived  12/01/2014    Swelling Lips swell Current Immunizations  Reviewed on 12/6/2017 Name Date Influenza Vaccine 10/23/2017 Influenza Vaccine Split 10/4/2011 TDAP Vaccine 4/26/2010 Not reviewed this visit You Were Diagnosed With   
  
 Codes Comments Elevated blood pressure reading    -  Primary ICD-10-CM: R03.0 ICD-9-CM: 796.2 Hyperlipidemia LDL goal <130     ICD-10-CM: E78.5 ICD-9-CM: 272.4 Anxiety     ICD-10-CM: F41.9 ICD-9-CM: 300.00 Vitals BP Pulse Temp Resp Height(growth percentile) Weight(growth percentile) 136/87 (BP 1 Location: Left arm, BP Patient Position: Sitting) 65 98.5 °F (36.9 °C) (Oral) 16 5' 6\" (1.676 m) 150 lb 6.4 oz (68.2 kg) SpO2 BMI Smoking Status 99% 24.28 kg/m2 Former Smoker Vitals History BMI and BSA Data Body Mass Index Body Surface Area  
 24.28 kg/m 2 1.78 m 2 Preferred Pharmacy Pharmacy Name Phone [id number], [address] [place] [address] [phone] Your Updated Medication List  
  
   
This list is accurate as of: 12/20/17  6:30 PM.  Always use your most recent med list.  
  
  
  
  
 ALPRAZolam 0.5 mg tablet Commonly known as:  Gautam Huitron Take 1 Tab by mouth nightly as needed for Anxiety. Max Daily Amount: 0.5 mg.  
  
 escitalopram oxalate 10 mg tablet Commonly known as:  Walter Perez Take 1 Tab by mouth daily. FLUCELVAX QUAD 9650-5416 (PF) Syrg injection Generic drug:  influenza vaccine 2017-18 (4 yrs+)(PF)  
INJECT 0.5ML INTRAMUSCULARLY ONCE  
  
 multivitamin tablet Commonly known as:  ONE A DAY Take 1 Tab by mouth daily. pantoprazole 40 mg tablet Commonly known as:  PROTONIX Take 1 Tab by mouth daily. simvastatin 20 mg tablet Commonly known as:  ZOCOR  
TAKE 1 TABLET BY MOUTH NIGHTLY Prescriptions Printed Refills ALPRAZolam (XANAX) 0.5 mg tablet 0 Sig: Take 1 Tab by mouth nightly as needed for Anxiety. Max Daily Amount: 0.5 mg.  
 Class: Print Route: Oral  
  
Follow-up Instructions Return in about 3 months (around 3/20/2018) for anxiety. Patient Instructions Anxiety Disorder: Care Instructions Your Care Instructions Anxiety is a normal reaction to stress. Difficult situations can cause you to have symptoms such as sweaty palms and a nervous feeling. In an anxiety disorder, the symptoms are far more severe. Constant worry, muscle tension, trouble sleeping, nausea and diarrhea, and other symptoms can make normal daily activities difficult or impossible. These symptoms may occur for no reason, and they can affect your work, school, or social life. Medicines, counseling, and self-care can all help. Follow-up care is a key part of your treatment and safety. Be sure to make and go to all appointments, and call your doctor if you are having problems. It's also a good idea to know your test results and keep a list of the medicines you take. How can you care for yourself at home? · Take medicines exactly as directed. Call your doctor if you think you are having a problem with your medicine. · Go to your counseling sessions and follow-up appointments. · Recognize and accept your anxiety. Then, when you are in a situation that makes you anxious, say to yourself, \"This is not an emergency.  I feel uncomfortable, but I am not in danger. I can keep going even if I feel anxious. \" · Be kind to your body: ¨ Relieve tension with exercise or a massage. ¨ Get enough rest. 
¨ Avoid alcohol, caffeine, nicotine, and illegal drugs. They can increase your anxiety level and cause sleep problems. ¨ Learn and do relaxation techniques. See below for more about these techniques. · Engage your mind. Get out and do something you enjoy. Go to a funny movie, or take a walk or hike. Plan your day. Having too much or too little to do can make you anxious. · Keep a record of your symptoms. Discuss your fears with a good friend or family member, or join a support group for people with similar problems. Talking to others sometimes relieves stress. · Get involved in social groups, or volunteer to help others. Being alone sometimes makes things seem worse than they are. · Get at least 30 minutes of exercise on most days of the week to relieve stress. Walking is a good choice. You also may want to do other activities, such as running, swimming, cycling, or playing tennis or team sports. Relaxation techniques Do relaxation exercises 10 to 20 minutes a day. You can play soothing, relaxing music while you do them, if you wish. · Tell others in your house that you are going to do your relaxation exercises. Ask them not to disturb you. · Find a comfortable place, away from all distractions and noise. · Lie down on your back, or sit with your back straight. · Focus on your breathing. Make it slow and steady. · Breathe in through your nose. Breathe out through either your nose or mouth. · Breathe deeply, filling up the area between your navel and your rib cage. Breathe so that your belly goes up and down. · Do not hold your breath. · Breathe like this for 5 to 10 minutes. Notice the feeling of calmness throughout your whole body.  
As you continue to breathe slowly and deeply, relax by doing the following for another 5 to 10 minutes: · Tighten and relax each muscle group in your body. You can begin at your toes and work your way up to your head. · Imagine your muscle groups relaxing and becoming heavy. · Empty your mind of all thoughts. · Let yourself relax more and more deeply. · Become aware of the state of calmness that surrounds you. · When your relaxation time is over, you can bring yourself back to alertness by moving your fingers and toes and then your hands and feet and then stretching and moving your entire body. Sometimes people fall asleep during relaxation, but they usually wake up shortly afterward. · Always give yourself time to return to full alertness before you drive a car or do anything that might cause an accident if you are not fully alert. Never play a relaxation tape while you drive a car. When should you call for help? Call 911 anytime you think you may need emergency care. For example, call if: 
? · You feel you cannot stop from hurting yourself or someone else. ? Keep the numbers for these national suicide hotlines: 5-320-019-TALK (4-987-952-212-677-2395) and 5-322-AJHRBJQ (6-937-889-314.410.5239). If you or someone you know talks about suicide or feeling hopeless, get help right away. ? Watch closely for changes in your health, and be sure to contact your doctor if: 
? · You have anxiety or fear that affects your life. ? · You have symptoms of anxiety that are new or different from those you had before. Where can you learn more? Go to http://tricia-grupo.info/. Enter P754 in the search box to learn more about \"Anxiety Disorder: Care Instructions. \" Current as of: May 12, 2017 Content Version: 11.4 © 3749-8647 [employer]. Care instructions adapted under license by [employer] (which disclaims liability or warranty for this information).  If you have questions about a medical condition or this instruction, always ask your healthcare professional. [address] any warranty or liability for your use of this information. Introducing \[facility] [place] [facility]\"" & HEALTH SERVICES! Dear Tuan Mills: Thank you for requesting a [employer] account. Our records indicate that you already have an active [employer] account. You can access your account anytime at [email or web address] Did you know that you can access your hospital and ER discharge instructions at any time in [employer]? You can also review all of your test results from your hospital stay or ER visit. Additional Information If you have questions, please visit the Frequently Asked Questions section of the [employer] website at [email or web address] Remember, [employer] is NOT to be used for urgent needs. For medical emergencies, dial 911. Now available from your iPhone and Android! Please provide this summary of care documentation to your next provider. Your primary care clinician is listed as Anika Carry. If you have any questions after today's visit, please call [phone].

## 2017-12-20 NOTE — PROGRESS NOTES
Chief Complaint   Patient presents with    Follow-up     hypertension     1. Have you been to the ER, urgent care clinic since your last visit? Hospitalized since your last visit? No    2. Have you seen or consulted any other health care providers outside of the [address] [place] [address] since your last visit? Include any pap smears or colon screening.  No     Patient in for follow up regarding HTN

## 2017-12-20 NOTE — PROGRESS NOTES
HISTORY OF PRESENT ILLNESS  Anny Chaparro is a 49 y.o. male. Blood pressure 136/87, pulse 65, temperature 98.5 °F (36.9 °C), temperature source Oral, resp. rate 16, height 5' 6\" (1.676 m), weight 150 lb 6.4 oz (68.2 kg), SpO2 99 %. Body mass index is 24.28 kg/(m^2). Chief Complaint   Patient presents with    Follow-up     hypertension      HPI  Patient is presenting for a follow up of chronic conditions. Elevated blood pressure: Today's blood pressure is 136/87. Wife reports that blood pressure readings at home are normal and lower than today's blood pressure. Anxiety: Patient's wife reports he had one episode since last visit and he took 0.5mg xanax with klonopin at that time, which caused increased drowsiness. He reports that klonopin does help alleviate the symptoms. She reports no anxiety attacks since then. He denies any suicidal ideation and denies any depressed mood. Wife also would like to know if patient needs to increase lexapro. We discussed that only taking xanax on an as needed will not cause any addiction. We discussed that any future episodes, to give him only 0.25mg xanax and a second 0.25 if he still has not calmed down. I also recommended his wife keep control of his pills. We also discussed open communication with his wife and myself. I recommended that currently we do not need to increase his lexapro and work on therapeutic lifestyle changes. Hyperlipidemia: Labs done 12/12/17 show total 154, HDL 45, LDL 90. Myalgia: Patient reports that he has myalgias after working out. I recommended stretching, icing, emilia-pena. Also recommended he should try increasing cardio and that swimming is a good way to help work every muscle in the body, including the heart. Current Outpatient Prescriptions   Medication Sig Dispense Refill    ALPRAZolam (XANAX) 0.5 mg tablet Take 1 Tab by mouth nightly as needed for Anxiety.  Max Daily Amount: 0.5 mg. 30 Tab 0    pantoprazole (PROTONIX) 40 mg tablet Take 1 Tab by mouth daily. 30 Tab 5    FLUCELVAX QUAD 1985-2402, PF, syrg injection INJECT 0.5ML INTRAMUSCULARLY ONCE  0    simvastatin (ZOCOR) 20 mg tablet TAKE 1 TABLET BY MOUTH NIGHTLY 90 Tab 1    escitalopram oxalate (LEXAPRO) 10 mg tablet Take 1 Tab by mouth daily. 30 Tab 5    multivitamin (ONE A DAY) tablet Take 1 Tab by mouth daily. Allergies   Allergen Reactions    Shellfish Derived Swelling     Lips swell     Past Medical History:   Diagnosis Date    Anxiety     Elevated cholesterol 4/5/2010    Migraine 4/5/2010    Right inguinal hernia 12/7/2014     Past Surgical History:   Procedure Laterality Date    ENDOSCOPY, COLON, DIAGNOSTIC  4/06 repeat in 5 years    Dr. Cinda Aburto     Family History   Problem Relation Age of Onset    Cancer Father      colon     Social History   Substance Use Topics    Smoking status: Former Smoker     Packs/day: 0.50     Years: 2.00     Quit date: 4/26/1993    Smokeless tobacco: Never Used    Alcohol use 2.4 oz/week     4 Cans of beer per week         ROS  Constitutional: Negative. Negative for malaise/fatigue. Eyes: Negative for blurred vision. Respiratory: Negative for shortness of breath. Cardiovascular: Negative for chest pain and leg swelling. Musculoskeletal: Negative. Neurological: Negative. Negative for dizziness and headaches. Psychological: Positive for anxiety attack, 1 episode since last visit  All other systems reviewed and are negative. Physical Exam  Constitutional: He is oriented to person, place, and time. He appears well-developed and well-nourished. Head: Normocephalic and atraumatic. Neck: Carotid bruit is not present. Cardiovascular: Normal rate, regular rhythm, normal heart sounds and intact distal pulses. Exam reveals no gallop and no friction rub. No murmur heard. Pulmonary/Chest: Effort normal and breath sounds normal. No respiratory distress. There is no wheezes, no rales, no tenderness.    Neurological: He is alert and oriented to person, place, and time. Psychiatric: He has a normal mood and affect. His behavior is normal. Judgment and thought content normal.   Nursing note and vitals reviewed    ASSESSMENT and PLAN  Diagnoses and all orders for this visit:    1. Elevated blood pressure reading  - stable, continue current regimen    2. Hyperlipidemia LDL goal <130  - labs reviewed and are at goal    3. Anxiety  -     ALPRAZolam (XANAX) 0.5 mg tablet; Take 1 Tab by mouth nightly as needed for Anxiety. Max Daily Amount: 0.5 mg.  - We discussed that only taking xanax on an as needed will not cause any addiction. We discussed that any future episodes, to give him only 0.25mg xanax and a second 0.25 if he still has not calmed down. I also recommended his wife keep control of his pills. We also discussed open communication with his wife and myself. I recommended that currently we do not need to increase his lexapro and work on therapeutic lifestyle changes. Follow-up Disposition:  Return in about 3 months (around 3/20/2018) for anxiety. Medication risks/benefits/costs/interactions/alternatives discussed with patient. Advised patient to call back or return to office if symptoms worsen/change/persist.  If patient cannot reach us or should anything more severe/urgent arise he/she should proceed directly to the nearest emergency department. Discussed expected course/resolution/complications of diagnosis in detail with patient. Patient given a written after visit summary which includes her diagnoses, current medications and vitals. Patient expressed understanding with the diagnosis and plan.     Written by Dominic Tony, as dictated by Dr. Remy Zamorano MD.   I have reviewed and agree with the above note and have made corrections where appropriate, Dr. Remy Zamorano MD

## 2017-12-20 NOTE — PATIENT INSTRUCTIONS
Anxiety Disorder: Care Instructions  Your Care Instructions    Anxiety is a normal reaction to stress. Difficult situations can cause you to have symptoms such as sweaty palms and a nervous feeling. In an anxiety disorder, the symptoms are far more severe. Constant worry, muscle tension, trouble sleeping, nausea and diarrhea, and other symptoms can make normal daily activities difficult or impossible. These symptoms may occur for no reason, and they can affect your work, school, or social life. Medicines, counseling, and self-care can all help. Follow-up care is a key part of your treatment and safety. Be sure to make and go to all appointments, and call your doctor if you are having problems. It's also a good idea to know your test results and keep a list of the medicines you take. How can you care for yourself at home? · Take medicines exactly as directed. Call your doctor if you think you are having a problem with your medicine. · Go to your counseling sessions and follow-up appointments. · Recognize and accept your anxiety. Then, when you are in a situation that makes you anxious, say to yourself, \"This is not an emergency. I feel uncomfortable, but I am not in danger. I can keep going even if I feel anxious. \"  · Be kind to your body:  ¨ Relieve tension with exercise or a massage. ¨ Get enough rest.  ¨ Avoid alcohol, caffeine, nicotine, and illegal drugs. They can increase your anxiety level and cause sleep problems. ¨ Learn and do relaxation techniques. See below for more about these techniques. · Engage your mind. Get out and do something you enjoy. Go to a funny movie, or take a walk or hike. Plan your day. Having too much or too little to do can make you anxious. · Keep a record of your symptoms. Discuss your fears with a good friend or family member, or join a support group for people with similar problems. Talking to others sometimes relieves stress.   · Get involved in social groups, or volunteer to help others. Being alone sometimes makes things seem worse than they are. · Get at least 30 minutes of exercise on most days of the week to relieve stress. Walking is a good choice. You also may want to do other activities, such as running, swimming, cycling, or playing tennis or team sports. Relaxation techniques  Do relaxation exercises 10 to 20 minutes a day. You can play soothing, relaxing music while you do them, if you wish. · Tell others in your house that you are going to do your relaxation exercises. Ask them not to disturb you. · Find a comfortable place, away from all distractions and noise. · Lie down on your back, or sit with your back straight. · Focus on your breathing. Make it slow and steady. · Breathe in through your nose. Breathe out through either your nose or mouth. · Breathe deeply, filling up the area between your navel and your rib cage. Breathe so that your belly goes up and down. · Do not hold your breath. · Breathe like this for 5 to 10 minutes. Notice the feeling of calmness throughout your whole body. As you continue to breathe slowly and deeply, relax by doing the following for another 5 to 10 minutes:  · Tighten and relax each muscle group in your body. You can begin at your toes and work your way up to your head. · Imagine your muscle groups relaxing and becoming heavy. · Empty your mind of all thoughts. · Let yourself relax more and more deeply. · Become aware of the state of calmness that surrounds you. · When your relaxation time is over, you can bring yourself back to alertness by moving your fingers and toes and then your hands and feet and then stretching and moving your entire body. Sometimes people fall asleep during relaxation, but they usually wake up shortly afterward. · Always give yourself time to return to full alertness before you drive a car or do anything that might cause an accident if you are not fully alert.  Never play a relaxation tape while you drive a car. When should you call for help? Call 911 anytime you think you may need emergency care. For example, call if:  ? · You feel you cannot stop from hurting yourself or someone else. ? Keep the numbers for these national suicide hotlines: 9-447-514-TALK (9-429.932.9476) and 5-423-KOODHJD (4-152.169.4945). If you or someone you know talks about suicide or feeling hopeless, get help right away. ? Watch closely for changes in your health, and be sure to contact your doctor if:  ? · You have anxiety or fear that affects your life. ? · You have symptoms of anxiety that are new or different from those you had before. Where can you learn more? Go to http://tricia-grupo.info/. Enter P754 in the search box to learn more about \"Anxiety Disorder: Care Instructions. \"  Current as of: May 12, 2017  Content Version: 11.4  © 8686-2637 [employer]. Care instructions adapted under license by [employer] (which disclaims liability or warranty for this information). If you have questions about a medical condition or this instruction, always ask your healthcare professional. [address] any warranty or liability for your use of this information.

## 2018-02-10 DIAGNOSIS — K21.9 GASTROESOPHAGEAL REFLUX DISEASE WITHOUT ESOPHAGITIS: ICD-10-CM

## 2018-02-13 ENCOUNTER — TELEPHONE (OUTPATIENT)
Dept: FAMILY MEDICINE CLINIC | Age: 48
End: 2018-02-13

## 2018-02-13 RX ORDER — PANTOPRAZOLE SODIUM 40 MG/1
40 TABLET, DELAYED RELEASE ORAL DAILY
Qty: 30 TAB | Refills: 5 | Status: SHIPPED | OUTPATIENT
Start: 2018-02-13 | End: 2018-10-09 | Stop reason: ALTCHOICE

## 2018-02-13 NOTE — TELEPHONE ENCOUNTER
Pharmacy on file verified  Pharmacy is requesting a prescription to replace their current prescription that was written for a 30 day supply to be changed to 90 day supply for Pantoprazole SOd dr 40 mg tab  Blair Monroy  02/13/18

## 2018-02-13 NOTE — TELEPHONE ENCOUNTER
[phone] [facility] spoke to pharmacist per Dr Milagros pollard to change from 30 days supply to 90 days supply

## 2018-02-27 ENCOUNTER — TELEPHONE (OUTPATIENT)
Dept: FAMILY MEDICINE CLINIC | Age: 48
End: 2018-02-27

## 2018-02-27 NOTE — TELEPHONE ENCOUNTER
[phone] attempted to call patient no answer left message to call me back has appointment 3/21/2018 at 5;30PM and see if patient can come in earlier

## 2018-03-08 DIAGNOSIS — E78.5 HYPERLIPIDEMIA LDL GOAL <130: ICD-10-CM

## 2018-03-09 DIAGNOSIS — F41.9 ANXIETY: ICD-10-CM

## 2018-03-09 RX ORDER — ESCITALOPRAM OXALATE 10 MG/1
TABLET ORAL
Qty: 30 TAB | Refills: 5 | Status: SHIPPED | OUTPATIENT
Start: 2018-03-09 | End: 2018-06-20 | Stop reason: ALTCHOICE

## 2018-03-09 RX ORDER — SIMVASTATIN 20 MG/1
TABLET, FILM COATED ORAL
Qty: 90 TAB | Refills: 1 | Status: SHIPPED | OUTPATIENT
Start: 2018-03-09 | End: 2018-10-09 | Stop reason: SDUPTHER

## 2018-03-12 DIAGNOSIS — F41.9 ANXIETY: ICD-10-CM

## 2018-03-12 DIAGNOSIS — E78.5 HYPERLIPIDEMIA LDL GOAL <130: ICD-10-CM

## 2018-03-14 RX ORDER — SIMVASTATIN 20 MG/1
TABLET, FILM COATED ORAL
Qty: 90 TAB | Refills: 1 | Status: SHIPPED | OUTPATIENT
Start: 2018-03-14 | End: 2018-10-09 | Stop reason: SDUPTHER

## 2018-03-14 RX ORDER — ESCITALOPRAM OXALATE 10 MG/1
TABLET ORAL
Qty: 30 TAB | Refills: 5 | Status: SHIPPED | OUTPATIENT
Start: 2018-03-14 | End: 2018-06-20 | Stop reason: ALTCHOICE

## 2018-06-11 ENCOUNTER — TELEPHONE (OUTPATIENT)
Dept: FAMILY MEDICINE CLINIC | Age: 48
End: 2018-06-11

## 2018-06-11 DIAGNOSIS — F41.9 ANXIETY: ICD-10-CM

## 2018-06-11 NOTE — TELEPHONE ENCOUNTER
[phone] spoke to Tressa Banda patients been to [facility] multiple times for Panic attack and need appointment with Dr. Tevin Maradiaga advised of patients appointment 6/13/2018 at [facility] understand

## 2018-06-11 NOTE — TELEPHONE ENCOUNTER
Patients wife Gee Villanueva( On [employer]) is calling on behalf of patient in regards to having patient seen in office with Dr. Manoj Padilla. She is requesting a call back in regards to this, she was informed of Dr. Jose Sevilla first available time for an appointment in July.      Best call back [phone]

## 2018-06-12 ENCOUNTER — TELEPHONE (OUTPATIENT)
Dept: FAMILY MEDICINE CLINIC | Age: 48
End: 2018-06-12

## 2018-06-12 NOTE — TELEPHONE ENCOUNTER
Patients wife Tonia (On [employer]IP[employer]) is calling in regards to have JOSH Cabrera requesting medical records for patient. She states patient was seen at [facility] on 6/11/18 then transported to [place] on [address] [address] on 6/11/18. She states patient is to be seen in office on 6/13/18 with Dr. Netta Paget. She is also requesting a call back.      Best call back [phone]

## 2018-06-12 NOTE — TELEPHONE ENCOUNTER
Gifty Irizarry called patient to reschedule patient appointment since PCP is out of the office 6/13/2018 in the evening patient reschedule for 6/20/2018 at 6;30PM I advised Radha notified Kristen Posada that records from [facility] was received

## 2018-06-12 NOTE — TELEPHONE ENCOUNTER
Luci Meléndez called back want patient to be seen tomorrow I advised her Dr Asuncion Foster is fully booked but will put him on a waitlist.    [phone] spoke to [address] [place], [place] [address]narayan  advised that we had cancellation 6/13/2018 at [address] [facility] per [address] [place], [place] [address]narayan  she will take it confirmed appointment 6/13/2018 at [id number] [facility] appointment 6/20/2018 since patient is being seen 6/13/2018

## 2018-06-20 ENCOUNTER — OFFICE VISIT (OUTPATIENT)
Dept: FAMILY MEDICINE CLINIC | Age: 48
End: 2018-06-20

## 2018-06-20 VITALS
DIASTOLIC BLOOD PRESSURE: 80 MMHG | BODY MASS INDEX: 23.63 KG/M2 | TEMPERATURE: 98.7 F | WEIGHT: 147 LBS | HEART RATE: 68 BPM | SYSTOLIC BLOOD PRESSURE: 120 MMHG | OXYGEN SATURATION: 99 % | HEIGHT: 66 IN | RESPIRATION RATE: 16 BRPM

## 2018-06-20 DIAGNOSIS — F41.0 PANIC ATTACKS: ICD-10-CM

## 2018-06-20 DIAGNOSIS — F41.9 ANXIETY: Primary | ICD-10-CM

## 2018-06-20 RX ORDER — CITALOPRAM 20 MG/1
TABLET, FILM COATED ORAL
Refills: 0 | COMMUNITY
Start: 2018-06-13 | End: 2018-06-20 | Stop reason: SDUPTHER

## 2018-06-20 RX ORDER — LORAZEPAM 1 MG/1
1-2 TABLET ORAL
Qty: 15 TAB | Refills: 0 | Status: SHIPPED | OUTPATIENT
Start: 2018-06-20 | End: 2018-11-26 | Stop reason: SDUPTHER

## 2018-06-20 RX ORDER — CITALOPRAM 20 MG/1
TABLET, FILM COATED ORAL
Qty: 30 TAB | Refills: 5 | Status: SHIPPED | OUTPATIENT
Start: 2018-06-20 | End: 2019-01-21

## 2018-06-20 RX ORDER — HYDROXYZINE PAMOATE 50 MG/1
CAPSULE ORAL
Refills: 0 | COMMUNITY
Start: 2018-06-13 | End: 2018-06-20 | Stop reason: SDUPTHER

## 2018-06-20 RX ORDER — HYDROXYZINE PAMOATE 50 MG/1
CAPSULE ORAL
Qty: 60 CAP | Refills: 2 | Status: SHIPPED | OUTPATIENT
Start: 2018-06-20 | End: 2018-11-26 | Stop reason: SDUPTHER

## 2018-06-20 NOTE — MR AVS SNAPSHOT
[address] [place] 13 
[phone] Patient: Daniella Parker MRN: [id number] :[id number] Visit Information Date & Time Provider Department Dept. Phone Encounter #  
 2018  6:30 PM Trever Cunha MD [address] [place], [place] [phone] [id number] Follow-up Instructions Return in about 3 months (around 2018) for anxiety/depression. Upcoming Health Maintenance Date Due Influenza Age 5 to Adult 2018 DTaP/Tdap/Td series (2 - Td) 2020 Allergies as of 2018  Review Complete On: 2018 By: Sydnee Mcdermott LPN Severity Noted Reaction Type Reactions Shellfish Derived  2014    Swelling Lips swell Current Immunizations  Reviewed on 2017 Name Date Influenza Vaccine 10/23/2017 Influenza Vaccine Split 10/4/2011 TDAP Vaccine 2010 Not reviewed this visit You Were Diagnosed With   
  
 Codes Comments Anxiety    -  Primary ICD-10-CM: F41.9 ICD-9-CM: 300.00 Panic attacks     ICD-10-CM: F41.0 ICD-9-CM: 300.01 Vitals BP Pulse Temp Resp Height(growth percentile) Weight(growth percentile) 120/80 68 98.7 °F (37.1 °C) (Oral) 16 5' 6\" (1.676 m) 147 lb (66.7 kg) SpO2 BMI Smoking Status 99% 23.73 kg/m2 Former Smoker Vitals History BMI and BSA Data Body Mass Index Body Surface Area  
 23.73 kg/m 2 1.76 m 2 Preferred Pharmacy Pharmacy Name Phone [id number] [place] [address] [place] [address] [phone] Your Updated Medication List  
  
   
This list is accurate as of 18  7:38 PM.  Always use your most recent med list.  
  
  
  
  
 citalopram 20 mg tablet Commonly known as:  CELEXA  
TAKE 1 TABLET BY MOUTH EVERY DAY FOR MOOD/ANXIETY FLUCELVAX QUAD 5372-6534 (PF) Syrg injection Generic drug:  influenza vaccine 2017-18 (4 yrs+)(PF)  
INJECT 0.5ML INTRAMUSCULARLY ONCE  
  
 hydrOXYzine pamoate 50 mg capsule Commonly known as:  VISTARIL  
TAKE 1 CAPSULE BY MOUTH TWICE A DAY LORazepam 1 mg tablet Commonly known as:  ATIVAN Take 1-2 Tabs by mouth every eight (8) hours as needed for Anxiety. Max Daily Amount: 6 mg.  
  
 multivitamin tablet Commonly known as:  ONE A DAY Take 1 Tab by mouth daily. pantoprazole 40 mg tablet Commonly known as:  PROTONIX Take 1 Tab by mouth daily. * simvastatin 20 mg tablet Commonly known as:  ZOCOR  
TAKE 1 TABLET BY MOUTH NIGHTLY * simvastatin 20 mg tablet Commonly known as:  ZOCOR  
TAKE 1 TABLET BY MOUTH NIGHTLY * Notice: This list has 2 medication(s) that are the same as other medications prescribed for you. Read the directions carefully, and ask your doctor or other care provider to review them with you. Prescriptions Printed Refills LORazepam (ATIVAN) 1 mg tablet 0 Sig: Take 1-2 Tabs by mouth every eight (8) hours as needed for Anxiety. Max Daily Amount: 6 mg. Class: Print Route: Oral  
  
Prescriptions Sent to Pharmacy Refills  
 hydrOXYzine pamoate (VISTARIL) 50 mg capsule 2 Sig: TAKE 1 CAPSULE BY MOUTH TWICE A DAY Class: Normal  
 Pharmacy: [facility] Ph #: [phone]  
 citalopram (CELEXA) 20 mg tablet 5 Sig: TAKE 1 TABLET BY MOUTH EVERY DAY FOR MOOD/ANXIETY Class: Normal  
 Pharmacy: [facility] Ph #: [phone] We Performed the Following REFERRAL TO PSYCHIATRY [REF91 Custom] Follow-up Instructions Return in about 3 months (around 9/20/2018) for anxiety/depression. Referral Information Referral ID Referred By Referred To [id number] [facility] Susanne Pike [facility] [place] [address] [place], [address] Phone: [phone] Fax: [phone] Visits Status Start Date End Date 1 New Request 6/20/18 6/20/19 If your referral has a status of pending review or denied, additional information will be sent to support the outcome of this decision. Patient Instructions Panic Attacks: Care Instructions Your Care Instructions During a panic attack, you may have a feeling of intense fear or terror, trouble breathing, chest pain or tightness, heartbeat changes, dizziness, sweating, and shaking. A panic attack starts suddenly and usually lasts from 5 to 20 minutes but may last even longer. You have the most anxiety about 10 minutes after the attack starts. An attack can begin with a stressful event, or it can happen without a cause. Although panic attacks can cause scary symptoms, you can learn to manage them with self-care, counseling, and medicine. Follow-up care is a key part of your treatment and safety. Be sure to make and go to all appointments, and call your doctor if you are having problems. It's also a good idea to know your test results and keep a list of the medicines you take. How can you care for yourself at home? · Take your medicine exactly as directed. Call your doctor if you think you are having a problem with your medicine. · Go to your counseling sessions and follow-up appointments. · Recognize and accept your anxiety. Then, when you are in a situation that makes you anxious, say to yourself, \"This is not an emergency. I feel uncomfortable, but I am not in danger. I can keep going even if I feel anxious. \" · Be kind to your body: ¨ Relieve tension with exercise or a massage. ¨ Get enough rest. 
¨ Avoid alcohol, caffeine, nicotine, and illegal drugs.  They can increase your anxiety level, cause sleep problems, or trigger a panic attack. ¨ Learn and do relaxation techniques. See below for more about these techniques. · Engage your mind. Get out and do something you enjoy. Go to a funny movie, or take a walk or hike. Plan your day. Having too much or too little to do can make you anxious. · Keep a record of your symptoms. Discuss your fears with a good friend or family member, or join a support group for people with similar problems. Talking to others sometimes relieves stress. · Get involved in social groups, or volunteer to help others. Being alone sometimes makes things seem worse than they are. · Get at least 30 minutes of exercise on most days of the week to relieve stress. Walking is a good choice. You also may want to do other activities, such as running, swimming, cycling, or playing tennis or team sports. Relaxation techniques Do relaxation exercises for 10 to 20 minutes a day. You can play soothing, relaxing music while you do them, if you wish. · Tell others in your house that you are going to do your relaxation exercises. Ask them not to disturb you. · Find a comfortable place, away from all distractions and noise. · Lie down on your back, or sit with your back straight. · Focus on your breathing. Make it slow and steady. · Breathe in through your nose. Breathe out through either your nose or mouth. · Breathe deeply, filling up the area between your navel and your rib cage. Breathe so that your belly goes up and down. · Do not hold your breath. · Breathe like this for 5 to 10 minutes. Notice the feeling of calmness throughout your whole body. As you continue to breathe slowly and deeply, relax by doing the following for another 5 to 10 minutes: · Tighten and relax each muscle group in your body. You can begin at your toes and work your way up to your head. · Imagine your muscle groups relaxing and becoming heavy. · Empty your mind of all thoughts. · Let yourself relax more and more deeply. · Become aware of the state of calmness that surrounds you. · When your relaxation time is over, you can bring yourself back to alertness by moving your fingers and toes and then your hands and feet and then stretching and moving your entire body. Sometimes people fall asleep during relaxation, but they usually wake up shortly afterward. · Always give yourself time to return to full alertness before you drive a car or do anything that might cause an accident if you are not fully alert. Never play a relaxation tape while driving a car. When should you call for help? Call 911 anytime you think you may need emergency care. For example, call if: 
? · You feel you cannot stop from hurting yourself or someone else. ? Watch closely for changes in your health, and be sure to contact your doctor if: 
? · Your panic attacks get worse. ? · You have new or different anxiety. ? · You are not getting better as expected. Where can you learn more? Go to http://tricia-grupo.info/. Enter H601 in the search box to learn more about \"Panic Attacks: Care Instructions. \" Current as of: May 12, 2017 Content Version: 11.4 © 7684-3596 [employer]. Care instructions adapted under license by [employer] (which disclaims liability or warranty for this information). If you have questions about a medical condition or this instruction, always ask your healthcare professional. [place] [address] any warranty or liability for your use of this information. Introducing [facility] & HEALTH SERVICES! Dear John Paul Reina: Thank you for requesting a FohBoh account. Our records indicate that you already have an active FohBoh account. You can access your account anytime at [email or web address] Did you know that you can access your hospital and ER discharge instructions at any time in FohBoh?   You can also review all of your test results from your hospital stay or ER visit. Additional Information If you have questions, please visit the Frequently Asked Questions section of the [email or web address] website at [email or web address] Remember, [email or web address] is NOT to be used for urgent needs. For medical emergencies, dial 911. Now available from your iPhone and Android! Please provide this summary of care documentation to your next provider. Your primary care clinician is listed as Rolf Pruitt. If you have any questions after today's visit, please call [phone].

## 2018-06-20 NOTE — TELEPHONE ENCOUNTER
Pharmacy on file verified  Last filled 3/14/18  Last labs 12/12/17  Last office visit 12/20/17 upcoming appt today 6/20/18  Pharmacy requesting 90 day supply  .   Requested Prescriptions     Pending Prescriptions Disp Refills    escitalopram oxalate (LEXAPRO) 10 mg tablet 30 Tab 5

## 2018-06-20 NOTE — PROGRESS NOTES
Chief Complaint   Patient presents with   [facility] Follow Up     anxiety follow up     1. Have you been to the ER, urgent care clinic since your last visit? Hospitalized since your last visit? 2. Have you seen or consulted any other health care providers outside of the [address] [place] [address] since your last visit? Include any pap smears or colon screening.  No

## 2018-06-20 NOTE — PROGRESS NOTES
HISTORY OF PRESENT ILLNESS  Wyatt Chaparro is a 51 y.o. male. Blood pressure 120/80, pulse 68, temperature 98.7 °F (37.1 °C), temperature source Oral, resp. rate 16, height 5' 6\" (1.676 m), weight 147 lb (66.7 kg), SpO2 99 %. Body mass index is 23.73 kg/(m^2). Chief Complaint   Patient presents with   [facility] Follow Up     anxiety follow up        HPI  Wyatt Chaparro 51 y.o. male  presents to the office today for hospital follow up. Pt presents with wife at bedside. Hospital follow up: Pt states that he was seen in [facility] on 06/11/18 due to an anxiety attack. Pt is unsure what causes his anxiety attacks. Pt's wife states that when pt is having an anxiety attack, he will run around the house and lay on the floor. She took pt to the dog park and had pt run around. Wife gave pt a Xanax and tried to get him to sleep, but his attack persisted. Pt does not think that there is any particular stress in his life that is causing his attacks. Pt is currently taking Celexa 20mg and Vistaril 50mg BID. He is not currently taking any Xanax unless necessary. Pt's wife has asked for a recommendation for psychiatry. Pt's wife has also asked if pt should try psychologists. Will refer pt to Dr. Luis Hernández (Psychiatry) for further evaluation. Discussed with pt that he can speak with Doroteo Butler (Behavioral therapy) if he wants to talk to someone about his anxiety. Will give pt Ativan 1mg to use when pt's attacks might cause him to have to go to the hospital. Recommended that pt's wife hold on to the medication and only give it to pt when it is absolutely necessary. Hyperlipidemia: Lipid panel on 12/12/17 notable for total cholesterol 154, LDL 90, HDL 45, and triglycerides 96. Pt continues with Simvastatin 20mg daily.         Current Outpatient Prescriptions   Medication Sig Dispense Refill    hydrOXYzine pamoate (VISTARIL) 50 mg capsule TAKE 1 CAPSULE BY MOUTH TWICE A DAY 60 Cap 2    citalopram (CELEXA) 20 mg tablet TAKE 1 TABLET BY MOUTH EVERY DAY FOR MOOD/ANXIETY 30 Tab 5    LORazepam (ATIVAN) 1 mg tablet Take 1-2 Tabs by mouth every eight (8) hours as needed for Anxiety. Max Daily Amount: 6 mg. 15 Tab 0    simvastatin (ZOCOR) 20 mg tablet TAKE 1 TABLET BY MOUTH NIGHTLY 90 Tab 1    FLUCELVAX QUAD 2253-0165, PF, syrg injection INJECT 0.5ML INTRAMUSCULARLY ONCE  0    multivitamin (ONE A DAY) tablet Take 1 Tab by mouth daily.  simvastatin (ZOCOR) 20 mg tablet TAKE 1 TABLET BY MOUTH NIGHTLY 90 Tab 1    pantoprazole (PROTONIX) 40 mg tablet Take 1 Tab by mouth daily. 30 Tab 5     Allergies   Allergen Reactions    Shellfish Derived Swelling     Lips swell     Past Medical History:   Diagnosis Date    Anxiety     Elevated cholesterol 4/5/2010    Migraine 4/5/2010    Right inguinal hernia 12/7/2014     Past Surgical History:   Procedure Laterality Date    ENDOSCOPY, COLON, DIAGNOSTIC  4/06 repeat in 5 years    Dr. Ana Garcia     Family History   Problem Relation Age of Onset    Cancer Father      colon     Social History   Substance Use Topics    Smoking status: Former Smoker     Packs/day: 0.50     Years: 2.00     Quit date: 4/26/1993    Smokeless tobacco: Never Used    Alcohol use 2.4 oz/week     4 Cans of beer per week        Review of Systems   Constitutional: Negative. Negative for malaise/fatigue. Eyes: Negative for blurred vision. Respiratory: Negative for shortness of breath. Cardiovascular: Negative for chest pain and leg swelling. Musculoskeletal: Negative. Neurological: Negative. Negative for dizziness and headaches. All other systems reviewed and are negative. Physical Exam   Constitutional: He is oriented to person, place, and time. He appears well-developed and well-nourished. HENT:   Head: Normocephalic and atraumatic. Neck: Carotid bruit is not present. Cardiovascular: Normal rate, regular rhythm, normal heart sounds and intact distal pulses.   Exam reveals no gallop and no friction rub. No murmur heard. Pulmonary/Chest: Effort normal and breath sounds normal. No respiratory distress. He has no wheezes. He has no rales. He exhibits no tenderness. Neurological: He is alert and oriented to person, place, and time. Psychiatric: He has a normal mood and affect. His behavior is normal. Judgment and thought content normal.   Nursing note and vitals reviewed. ASSESSMENT and PLAN  Diagnoses and all orders for this visit:    1. Anxiety  -     hydrOXYzine pamoate (VISTARIL) 50 mg capsule; TAKE 1 CAPSULE BY MOUTH TWICE A DAY  -     citalopram (CELEXA) 20 mg tablet; TAKE 1 TABLET BY MOUTH EVERY DAY FOR MOOD/ANXIETY  -     [facility] Psychiatry [facility] (Dr. Marlena Craft)  - Will refer pt to Dr. Marlena Craft (Psychiatry) for further evaluation. Will give pt Ativan 1mg to use when pt's attacks might cause him to have to go to the hospital. Recommended that pt's wife hold on to the medication and only give it to pt when it is absolutely necessary.   -  Discussed with pt that he can speak with Matthew Toro (Behavioral therapy) if he wants to talk to someone about his anxiety. 2. Panic attacks  -     hydrOXYzine pamoate (VISTARIL) 50 mg capsule; TAKE 1 CAPSULE BY MOUTH TWICE A DAY  -     citalopram (CELEXA) 20 mg tablet; TAKE 1 TABLET BY MOUTH EVERY DAY FOR MOOD/ANXIETY  -     [facility]  -     LORazepam (ATIVAN) 1 mg tablet; Take 1-2 Tabs by mouth every eight (8) hours as needed for Anxiety. Max Daily Amount: 6 mg.  - See above. Follow-up Disposition:  Return in about 3 months (around 9/20/2018) for anxiety/depression. Medication risks/benefits/costs/interactions/alternatives discussed with patient. Advised patient to call back or return to office if symptoms worsen/change/persist.  If patient cannot reach us or should anything more severe/urgent arise he/she should proceed directly to the nearest emergency department.   Discussed expected course/resolution/complications of diagnosis in detail with patient. Patient given a written after visit summary which includes her diagnoses, current medications and vitals. Patient expressed understanding with the diagnosis and plan. Written by josi Espana, as dictated by Cm Holloway M.D.  7:22 PM - 7:38 PM   Total time spent with the patient 16 minutes, greater than 50% of time spent counseling patient.    I have reviewed and agree with the above note and have made corrections where appropriate, Dr. Vandana Quiles MD

## 2018-06-20 NOTE — PATIENT INSTRUCTIONS
Panic Attacks: Care Instructions  Your Care Instructions    During a panic attack, you may have a feeling of intense fear or terror, trouble breathing, chest pain or tightness, heartbeat changes, dizziness, sweating, and shaking. A panic attack starts suddenly and usually lasts from 5 to 20 minutes but may last even longer. You have the most anxiety about 10 minutes after the attack starts. An attack can begin with a stressful event, or it can happen without a cause. Although panic attacks can cause scary symptoms, you can learn to manage them with self-care, counseling, and medicine. Follow-up care is a key part of your treatment and safety. Be sure to make and go to all appointments, and call your doctor if you are having problems. It's also a good idea to know your test results and keep a list of the medicines you take. How can you care for yourself at home? · Take your medicine exactly as directed. Call your doctor if you think you are having a problem with your medicine. · Go to your counseling sessions and follow-up appointments. · Recognize and accept your anxiety. Then, when you are in a situation that makes you anxious, say to yourself, \"This is not an emergency. I feel uncomfortable, but I am not in danger. I can keep going even if I feel anxious. \"  · Be kind to your body:  ¨ Relieve tension with exercise or a massage. ¨ Get enough rest.  ¨ Avoid alcohol, caffeine, nicotine, and illegal drugs. They can increase your anxiety level, cause sleep problems, or trigger a panic attack. ¨ Learn and do relaxation techniques. See below for more about these techniques. · Engage your mind. Get out and do something you enjoy. Go to a funny movie, or take a walk or hike. Plan your day. Having too much or too little to do can make you anxious. · Keep a record of your symptoms. Discuss your fears with a good friend or family member, or join a support group for people with similar problems.  Talking to others sometimes relieves stress. · Get involved in social groups, or volunteer to help others. Being alone sometimes makes things seem worse than they are. · Get at least 30 minutes of exercise on most days of the week to relieve stress. Walking is a good choice. You also may want to do other activities, such as running, swimming, cycling, or playing tennis or team sports. Relaxation techniques  Do relaxation exercises for 10 to 20 minutes a day. You can play soothing, relaxing music while you do them, if you wish. · Tell others in your house that you are going to do your relaxation exercises. Ask them not to disturb you. · Find a comfortable place, away from all distractions and noise. · Lie down on your back, or sit with your back straight. · Focus on your breathing. Make it slow and steady. · Breathe in through your nose. Breathe out through either your nose or mouth. · Breathe deeply, filling up the area between your navel and your rib cage. Breathe so that your belly goes up and down. · Do not hold your breath. · Breathe like this for 5 to 10 minutes. Notice the feeling of calmness throughout your whole body. As you continue to breathe slowly and deeply, relax by doing the following for another 5 to 10 minutes:  · Tighten and relax each muscle group in your body. You can begin at your toes and work your way up to your head. · Imagine your muscle groups relaxing and becoming heavy. · Empty your mind of all thoughts. · Let yourself relax more and more deeply. · Become aware of the state of calmness that surrounds you. · When your relaxation time is over, you can bring yourself back to alertness by moving your fingers and toes and then your hands and feet and then stretching and moving your entire body. Sometimes people fall asleep during relaxation, but they usually wake up shortly afterward.   · Always give yourself time to return to full alertness before you drive a car or do anything that might cause an accident if you are not fully alert. Never play a relaxation tape while driving a car. When should you call for help? Call 911 anytime you think you may need emergency care. For example, call if:  ? · You feel you cannot stop from hurting yourself or someone else. ? Watch closely for changes in your health, and be sure to contact your doctor if:  ? · Your panic attacks get worse. ? · You have new or different anxiety. ? · You are not getting better as expected. Where can you learn more? Go to http://tricia-grupo.info/. Enter H601 in the search box to learn more about \"Panic Attacks: Care Instructions. \"  Current as of: May 12, 2017  Content Version: 11.4  © 3313-3662 [employer], [employer]. Care instructions adapted under license by [employer] (which disclaims liability or warranty for this information). If you have questions about a medical condition or this instruction, always ask your healthcare professional. [place] [address] any warranty or liability for your use of this information.

## 2018-06-21 RX ORDER — ESCITALOPRAM OXALATE 10 MG/1
TABLET ORAL
Qty: 30 TAB | Refills: 5 | OUTPATIENT
Start: 2018-06-21

## 2018-10-08 DIAGNOSIS — E78.5 HYPERLIPIDEMIA LDL GOAL <130: ICD-10-CM

## 2018-10-09 ENCOUNTER — OFFICE VISIT (OUTPATIENT)
Dept: BEHAVIORAL/MENTAL HEALTH CLINIC | Age: 48
End: 2018-10-09

## 2018-10-09 VITALS
HEIGHT: 66 IN | SYSTOLIC BLOOD PRESSURE: 142 MMHG | HEART RATE: 63 BPM | WEIGHT: 151 LBS | DIASTOLIC BLOOD PRESSURE: 90 MMHG | BODY MASS INDEX: 24.27 KG/M2

## 2018-10-09 DIAGNOSIS — F40.01 PANIC DISORDER WITH AGORAPHOBIA: ICD-10-CM

## 2018-10-09 DIAGNOSIS — F12.10 CANNABIS ABUSE: ICD-10-CM

## 2018-10-09 DIAGNOSIS — F41.1 GENERALIZED ANXIETY DISORDER: Primary | ICD-10-CM

## 2018-10-09 RX ORDER — SERTRALINE HYDROCHLORIDE 50 MG/1
50 TABLET, FILM COATED ORAL DAILY
Qty: 30 TAB | Refills: 2 | Status: SHIPPED | OUTPATIENT
Start: 2018-10-09 | End: 2018-11-26 | Stop reason: SDUPTHER

## 2018-10-09 RX ORDER — SIMVASTATIN 20 MG/1
TABLET, FILM COATED ORAL
Qty: 90 TAB | Refills: 0 | Status: SHIPPED | OUTPATIENT
Start: 2018-10-09 | End: 2019-01-08 | Stop reason: SDUPTHER

## 2018-10-09 NOTE — TELEPHONE ENCOUNTER
Refill provided.  Pt is over due for follow-up, please advise for him to schedule appointment with PCP thank you

## 2018-10-09 NOTE — PROGRESS NOTES
Ray Krysta 1970 
51 y.o. 
male [place]  Chief Complaint Patient presents with  Anxiety  Depression [place]:  
This is a 51years old   male with past psychiatric history and treatment of anxiety disorder who was seen with his wife for the first time to establish care and medication management referred by his PCP Dr. Maggie Vitale who prescribed him Ativan 1-2 mg for panic attacks and Celexa 20 mg daily which she is taking for past 4 months. They presented on time, was alert awake oriented to time person and place and was calm and cooperative, polite, and pleasant during the interview. He was able to provide pertinent history but his wife actually contributed more and he was able to discuss treatment alternatives and decide about the plan. Patient was in his usual state of health until 15 years ago when he started to have symptoms of anxiety which were initially classic for generalized anxiety but then he started to have panic attacks increasingly in past couple of years requiring him to go to ED and actually getting hospitalized in psychiatry June of this year. He is score 7 on PHQ 9 indicating mild depression and denied ever having any major depressive episode. He is scored 21 indicating mild to moderate anxiety on Peck anxiety rating scale and his symptoms are classic for generalized anxiety, panic attacks with agoraphobia. Finally his mood disorder questionnaire is negative and he denied ever having any manic or hypomanic episode, denied ever having any hallucination, and denied any substance abuse except for smoking marijuana on regular basis for past 15 years.   They were provided with support and psychoeducation, and after discussing risk/benefits/expectations with treatment alternatives available, patient decided to gradually taper and discontinue Celexa, gradually start Zoloft 25 mg and taking up to 75 mg to treat anxiety, and will take hydroxyzine on regular basis and Ativan only if urgently needed. He was educated about CBT techniques and provided written instructions and provided names and contact information for providers in area. PAST PSYCHIATRIC HISTORY: 
Patient report 1 hospitalization at [facility] June of this year when he was admitted for 2 nights for having back to back panic attacks for about 2 days. He was treated by Dr. Yelitza Hernandez who changed his Lexapro 10 mg to Celexa 20 mg and Vistaril 50 mg was a started in the hospital.  He denies any substance abuse detox or rehab, and denies any suicide attempt ever. FAMILY HISTORY: 
Any of first degree relatives including biological parents, siblings, first cousins on both sides, uncle/aunt on both sides, and grand parents on both sides have been diagnosed or treated for any mental illness, substance abuse or attempted/commited suicide. One brother and couple of cousins on father's side with panic attacks and anxiety. SUBSTANCE USE HISTORY:  
TOBACCO: Smoke cigarette for couple of years at the age 24 and quit his smoking. ALCOHOL: Drinks couple of beers a month and denies any abuse. CANNABIS: Smoke 3-4 cigarettes of marijuana daily for past 15 years. COCAINE, OPIOIDS, and OTHERS: Patient denies. MEDICAL HISTORY:  
 has a past medical history of Anxiety; Elevated cholesterol (4/5/2010); Migraine (4/5/2010); and Right inguinal hernia (12/7/2014). ALLERGIES:  
Allergies Allergen Reactions  Shellfish Derived Swelling Lips swell VITAL SIGNS: 
/90  Pulse 63  Ht 5' 6\" (1.676 m)  Wt 68.5 kg (151 lb)  BMI 24.37 kg/m2 Current Outpatient Prescriptions Medication Sig Dispense Refill  sertraline (ZOLOFT) 50 mg tablet Take 1 Tab by mouth daily.  30 Tab 2  
 hydrOXYzine pamoate (VISTARIL) 50 mg capsule TAKE 1 CAPSULE BY MOUTH TWICE A DAY 60 Cap 2  
  citalopram (CELEXA) 20 mg tablet TAKE 1 TABLET BY MOUTH EVERY DAY FOR MOOD/ANXIETY 30 Tab 5  
 LORazepam (ATIVAN) 1 mg tablet Take 1-2 Tabs by mouth every eight (8) hours as needed for Anxiety. Max Daily Amount: 6 mg. 15 Tab 0  
 simvastatin (ZOCOR) 20 mg tablet TAKE 1 TABLET BY MOUTH NIGHTLY 90 Tab 1  
 FLUCELVAX QUAD 9845-5874, PF, syrg injection INJECT 0.5ML INTRAMUSCULARLY ONCE  0  
 multivitamin (ONE A DAY) tablet Take 1 Tab by mouth daily. ROS: 
Constitutional: Negative for fatigue and weight loss Eyes: Negative for contacts/classes ENT: Negative for hearing loss and tinnitus Respiratory: Negative for cough or wheezing Cardiovascular: Negative for chest pain, palpitations Gastrointestinal: Negative for reflux symptoms and constipation Genitourinary: Negative for frequency and urinary incontinence Musculoskeletal: Negative for myalgias and arthralgias Neurological: Negative for memory problems Behavioral/psych: Positive for anxiety, mild depression, negative for SI/HI Endocrine: Negative for diabetic symptoms including polyuria and polydipsia LEGAL HISTORY: 
Patient denies. SOCIAL HISTORY: 
Patient was born and raised in [place] by  parent until age 14 when his father  and his mother remarried. History of childhood abuse: Patient denies. . Education: Graduated from high school in [employer] no college.  history: Denies. Marriage and children:  for 16 years but together for 29 years and has 2 boys age 15 and 6. Temple/Sprituality: Believe in God but does not go to Sikhism. 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 MENTAL STATUS EXAMINATION:  
Patient is a 51 y.o. male [facility] who looks slightly older than his stated age. Patient appearance is casually dressed with fair hygiene.  Speech is regular rate and rhythm, fluent language, and thought process is linear, logical, and goal directed. Reported mood is anxious with mood congruent anxious affect. Patient denies any suicidal ideation, homicidal ideation, and auditory visual hallucination. Not observed to be delusional or paranoid. Insight, judgement, reliability and impulse control is limited to intact. Cognition was within normal limit and patient was observed to be reliable. DIAGNOSIS: 
Encounter Diagnoses Name Primary?  Generalized anxiety disorder Yes  Panic disorder with agoraphobia  Cannabis abuse PLAN: 
1. MEDICATION:  
1.  Zoloft 25 mg for 1 week, 50 mg for next 2 weeks, 75 mg before coming to see me in 4 weeks. Hydroxyzine 50 mg up to 3 times a day as needed for panic attack. Ativan 1 mg as needed for severe panic attack which may require to go to hospital.  Patient was provided with psycho education, discussed risk/benefits, and expectations from medication changes. Patient agrees with plan. 2. PSYCHOTHERAPY: Patient was provided with supportive therapy, strongly encourage to seek psychotherapy. Provided list of providers in area and written instructions to get cognitive behavioral therapy specifically for panic disorder. 3. MEDICAL CARE: Patient was strongly encourage to take their medical medications and follow up with their PCP on regular basis. His current weight is 151 pounds and he is taking medication for cholesterol. 4. SUBSTANCE ABUSE CARE: Patient denies a smoking cigarettes, drinking alcohol, and accepted to smoke 3-4 cigarettes of marijuana daily for past 15 years. 5. FOLLOW UP: Follow-up Disposition: 
Return in about 4 weeks (around 11/6/2018) for Medication management. Patient and his wife were seen for 45 minutes and more than half of the time is spent in counseling.

## 2018-10-09 NOTE — MR AVS SNAPSHOT
[address] [facility] 
[phone] Patient: Jeanmarie Felipe MRN: [id number] :1970 Visit Information Date & Time Provider Department Dept. Phone Encounter #  
 10/9/2018  9:00 AM Bianca Merino MD [address] [facility] [phone] [id number] Follow-up Instructions Return in about 4 weeks (around 2018) for Medication management. Your Appointments 2018  4:00 PM  
ESTABLISHED PATIENT with Bianca Merino MD  
[address]) Appt Note: 1 month f/u  
 [address] [facility] [address] [id number] [place] [address] [address] [facility] Upcoming Health Maintenance Date Due Influenza Age 5 to Adult 2018 DTaP/Tdap/Td series (2 - Td) 2020 Allergies as of 10/9/2018  Review Complete On: 10/9/2018 By: Bianca Merino MD  
  
 Severity Noted Reaction Type Reactions Shellfish Derived  2014    Swelling Lips swell Current Immunizations  Reviewed on 2017 Name Date Influenza Vaccine 10/23/2017 Influenza Vaccine Split 10/4/2011 TDAP Vaccine 2010 Not reviewed this visit You Were Diagnosed With   
  
 Codes Comments Generalized anxiety disorder    -  Primary ICD-10-CM: F41.1 ICD-9-CM: 300.02 Panic disorder with agoraphobia     ICD-10-CM: F40.01 
ICD-9-CM: 300.21 Cannabis abuse     ICD-10-CM: F12.10 ICD-9-CM: 305.20 Vitals BP Pulse Height(growth percentile) Weight(growth percentile) BMI Smoking Status 142/90 63 5' 6\" (1.676 m) 151 lb (68.5 kg) 24.37 kg/m2 Former Smoker Vitals History BMI and BSA Data Body Mass Index Body Surface Area  
 24.37 kg/m 2 1.79 m 2 Preferred Pharmacy Pharmacy Name Phone  [id number] [phone] [phone] Your Updated Medication List  
  
   
This list is accurate as of 10/9/18  9:38 AM.  Always use your most recent med list.  
  
  
  
  
 citalopram 20 mg tablet Commonly known as:  CELEXA  
TAKE 1 TABLET BY MOUTH EVERY DAY FOR MOOD/ANXIETY FLUCELVAX QUAD 0352-1527 (PF) Syrg injection Generic drug:  influenza vaccine 2017-18 (4 yrs+)(PF)  
INJECT 0.5ML INTRAMUSCULARLY ONCE  
  
 hydrOXYzine pamoate 50 mg capsule Commonly known as:  VISTARIL  
TAKE 1 CAPSULE BY MOUTH TWICE A DAY LORazepam 1 mg tablet Commonly known as:  ATIVAN Take 1-2 Tabs by mouth every eight (8) hours as needed for Anxiety. Max Daily Amount: 6 mg.  
  
 multivitamin tablet Commonly known as:  ONE A DAY Take 1 Tab by mouth daily. sertraline 50 mg tablet Commonly known as:  ZOLOFT Take 1 Tab by mouth daily. simvastatin 20 mg tablet Commonly known as:  ZOCOR  
TAKE 1 TABLET BY MOUTH NIGHTLY Prescriptions Sent to Pharmacy Refills  
 sertraline (ZOLOFT) 50 mg tablet 2 Sig: Take 1 Tab by mouth daily. Class: Normal  
 Pharmacy: [facility] #: [phone] Route: Oral  
  
Follow-up Instructions Return in about 4 weeks (around 11/6/2018) for Medication management. Introducing \[facility] [place] [facility]\"" & HEALTH SERVICES! Dear Casandra Courtney: Thank you for requesting a [employer] account. Our records indicate that you already have an active NuORDER account. You can access your account anytime at [email or web address] Did you know that you can access your hospital and ER discharge instructions at any time in NuORDER? You can also review all of your test results from your hospital stay or ER visit. Additional Information If you have questions, please visit the Frequently Asked Questions section of the [employer] website at [email or web address] Remember, [employer] is NOT to be used for urgent needs. For medical emergencies, dial 911. Now available from your iPhone and Android! Please provide this summary of care documentation to your next provider. Your primary care clinician is listed as Chloe Stanford. If you have any questions after today's visit, please call [phone].

## 2018-10-17 DIAGNOSIS — F41.9 ANXIETY: ICD-10-CM

## 2018-10-17 DIAGNOSIS — F41.0 PANIC ATTACKS: ICD-10-CM

## 2018-10-23 RX ORDER — HYDROXYZINE PAMOATE 50 MG/1
CAPSULE ORAL
Qty: 60 CAP | Refills: 5 | OUTPATIENT
Start: 2018-10-23

## 2018-11-26 ENCOUNTER — OFFICE VISIT (OUTPATIENT)
Dept: BEHAVIORAL/MENTAL HEALTH CLINIC | Age: 48
End: 2018-11-26

## 2018-11-26 VITALS
HEART RATE: 64 BPM | SYSTOLIC BLOOD PRESSURE: 133 MMHG | DIASTOLIC BLOOD PRESSURE: 86 MMHG | WEIGHT: 153 LBS | HEIGHT: 66 IN | BODY MASS INDEX: 24.59 KG/M2

## 2018-11-26 DIAGNOSIS — F41.0 PANIC ATTACKS: ICD-10-CM

## 2018-11-26 DIAGNOSIS — F41.9 ANXIETY: ICD-10-CM

## 2018-11-26 DIAGNOSIS — F12.10 CANNABIS ABUSE: Primary | ICD-10-CM

## 2018-11-26 RX ORDER — SERTRALINE HYDROCHLORIDE 100 MG/1
200 TABLET, FILM COATED ORAL DAILY
Qty: 60 TAB | Refills: 2 | Status: SHIPPED | OUTPATIENT
Start: 2018-11-26 | End: 2019-01-21 | Stop reason: SDUPTHER

## 2018-11-26 RX ORDER — LORAZEPAM 1 MG/1
1-2 TABLET ORAL
Qty: 15 TAB | Refills: 0 | Status: SHIPPED | OUTPATIENT
Start: 2018-11-26 | End: 2019-06-18 | Stop reason: SDUPTHER

## 2018-11-26 RX ORDER — HYDROXYZINE PAMOATE 50 MG/1
CAPSULE ORAL
Qty: 60 CAP | Refills: 2 | Status: SHIPPED | OUTPATIENT
Start: 2018-11-26 | End: 2019-04-15

## 2018-11-26 NOTE — PROGRESS NOTES
Lance Russell Krysta  1970  51 y.o.  male  [place]     Chief Complaint   Patient presents with    Panic Attack    Addiction problem     Cannabis       [place]:   This is a 51years old   male with past psychiatric history and treatment of anxiety disorder who was seen with his wife for the first time to establish care and medication management referred by his PCP Dr. Dav Wu who prescribed him Ativan 1-2 mg for panic attacks and Celexa 20 mg daily which she is taking for past 4 months. He was seen for the first time on October 9 when he decided to discontinue Celexa, try Zoloft, and try hydroxyzine 50 mg couple of times a day as needed for anxiety. He was advised not to take Ativan except for while having a panic attack which is approximately once a month. The patient and his wife presented on time, he was alert awake oriented to time person and place and was calm and cooperative, polite, and pleasant during the interview. He reported compliance with Zoloft 50 mg daily, takes Vistaril 50 mg at bedtime, approximately once a week require ZzzQuil or Benadryl in order to sleep, and required 1-1/2 mg of Ativan on Thanksgiving night when he wake up at 1 AM with severe panic attack which lasted for about 90 minutes despite of taking Ativan. He said that he has used Xanax up to 1 mg in past without any benefits and has also taken Klonopin but Ativan work better. Patient was in his usual state of health until 15 years ago when he started to have symptoms of anxiety which were initially classic for generalized anxiety but then he started to have panic attacks increasingly in past couple of years requiring him to go to ED and actually getting hospitalized in psychiatry June of this year. He denied ever having any manic or hypomanic episode, denied ever having any hallucination, and denied any substance abuse except for smoking marijuana on regular basis for past 15 years.       Patient and his wife were provided with support and psychoeducation, and after discussing risk/benefits/expectations with treatment alternatives available, patient decided to gradually increase and maximize Zoloft 200, continue hydroxyzine 50 mg at bedtime, and will take Ativan up to 2 mg only if urgently needed. He was educated about CBT techniques and provided written instructions and provided names and contact information for providers in area. SUBSTANCE USE HISTORY:   TOBACCO: Smoke cigarette for couple of years at the age 24 and quit his smoking. ALCOHOL: Drinks couple of beers a month and denies any abuse. CANNABIS: Smoke 1/4 gram of marijuana approximately per week (he smokes through pipe couple of pinches 10-15 times daily) for past 15 years. COCAINE, OPIOIDS, and OTHERS: Patient denies. MEDICAL HISTORY:    has a past medical history of Anxiety, Elevated cholesterol, Migraine, and Right inguinal hernia. ALLERGIES:   Allergies   Allergen Reactions    Shellfish Derived Swelling     Lips swell       VITAL SIGNS:  /86   Pulse 64   Ht 5' 6\" (1.676 m)   Wt 69.4 kg (153 lb)   BMI 24.69 kg/m²     Current Outpatient Medications   Medication Sig Dispense Refill    sertraline (ZOLOFT) 100 mg tablet Take 2 Tabs by mouth daily. 60 Tab 2    hydrOXYzine pamoate (VISTARIL) 50 mg capsule TAKE 1 CAPSULE BY MOUTH TWICE A DAY 60 Cap 2    LORazepam (ATIVAN) 1 mg tablet Take 1-2 Tabs by mouth every eight (8) hours as needed for Anxiety. Max Daily Amount: 6 mg. 15 Tab 0    simvastatin (ZOCOR) 20 mg tablet TAKE ONE TABLET BY MOUTH NIGHTLY 90 Tab 0    citalopram (CELEXA) 20 mg tablet TAKE 1 TABLET BY MOUTH EVERY DAY FOR MOOD/ANXIETY 30 Tab 5    FLUCELVAX QUAD [id number], PF, syrg injection INJECT 0.5ML INTRAMUSCULARLY ONCE  0    multivitamin (ONE A DAY) tablet Take 1 Tab by mouth daily.          ROS:  Constitutional: Negative for fatigue and weight loss  Eyes: Negative for contacts/glasses  ENT: Negative for hearing loss and tinnitus  Respiratory: Negative except for when having panic attack. Cardiovascular: Negative except for when having panic attack  Gastrointestinal: Negative except for having panic attack  Behavioral/psych: Positive for insomnia, anxiety, depression, negative for SI/HI  Endocrine: Negative for diabetic symptoms including polyuria and polydipsia    MENTAL STATUS EXAMINATION:   Patient is a 51 y.o. male [facility] who looks slightly older than his stated age. Patient appearance is casually dressed with fair hygiene. Speech is regular rate and rhythm, fluent language, and thought process is linear, logical, and goal directed. Reported mood is anxious with mood congruent anxious affect. Patient denies any suicidal ideation, homicidal ideation, and auditory visual hallucination. Not observed to be delusional or paranoid. Insight, judgement, reliability and impulse control is limited to intact. Cognition was within normal limit and patient was observed to be reliable. DIAGNOSIS:  Encounter Diagnoses   Name Primary?  Cannabis abuse Yes    Anxiety     Panic attacks        PLAN:  1. MEDICATION:   1.  Zoloft 100 mg daily for 2 weeks, 150 mg daily for next 2 weeks, and 200 mg until returning 2 months. 2.  Vistaril 50 mg at bedtime. 3.  Ativan 1-2 mg only as needed for panic attack #15 with no refill. He was provided with same prescription by his PCP several months ago and his wife is very much invested into his treatment and manages his medications. Patient was provided with psycho education, discussed risk/benefits, and expectations from medication changes. Patient agrees with plan. 2. PSYCHOTHERAPY: Patient was provided with supportive therapy, strongly encourage to seek psychotherapy. Provided name and contact information for family focus which may have urgent availability and is strongly encouraged to discuss CBT for panic disorder.     3. MEDICAL CARE: Patient was strongly encourage to take their medical medications and follow up with their PCP on regular basis. He has gained 2 pounds since last seen on October 9 and his weight today is 153 pounds. He denies any other medical illness at this time. 4. SUBSTANCE ABUSE CARE: Patient denies a smoking cigarette, drinking alcohol, accepted to smoke marijuana regularly several times a day about one fourth of a gram per week. 5. FOLLOW UP:   Follow-up Disposition:  Return in about 2 months (around 1/26/2019) for Medication management.

## 2019-01-08 DIAGNOSIS — E78.5 HYPERLIPIDEMIA LDL GOAL <130: ICD-10-CM

## 2019-01-08 NOTE — TELEPHONE ENCOUNTER
Pharmacy is requesting refill  .   Requested Prescriptions     Pending Prescriptions Disp Refills    simvastatin (ZOCOR) 20 mg tablet 90 Tab 0     LOV :  January 03, 2019  Last refill : 10/9/2018  Pharmacy verified

## 2019-01-10 RX ORDER — SIMVASTATIN 20 MG/1
TABLET, FILM COATED ORAL
Qty: 30 TAB | Refills: 0 | Status: SHIPPED | OUTPATIENT
Start: 2019-01-10 | End: 2019-02-15 | Stop reason: SDUPTHER

## 2019-01-21 ENCOUNTER — OFFICE VISIT (OUTPATIENT)
Dept: BEHAVIORAL/MENTAL HEALTH CLINIC | Age: 49
End: 2019-01-21

## 2019-01-21 VITALS
HEIGHT: 66 IN | DIASTOLIC BLOOD PRESSURE: 91 MMHG | BODY MASS INDEX: 24.11 KG/M2 | WEIGHT: 150 LBS | SYSTOLIC BLOOD PRESSURE: 127 MMHG | HEART RATE: 73 BPM

## 2019-01-21 DIAGNOSIS — F12.10 CANNABIS ABUSE: ICD-10-CM

## 2019-01-21 DIAGNOSIS — F41.0 PANIC ATTACKS: ICD-10-CM

## 2019-01-21 DIAGNOSIS — F41.1 GENERALIZED ANXIETY DISORDER: Primary | ICD-10-CM

## 2019-01-21 RX ORDER — SERTRALINE HYDROCHLORIDE 100 MG/1
200 TABLET, FILM COATED ORAL DAILY
Qty: 60 TAB | Refills: 2 | Status: SHIPPED | OUTPATIENT
Start: 2019-01-21 | End: 2019-06-18 | Stop reason: SDUPTHER

## 2019-01-21 NOTE — PROGRESS NOTES
Sukhdeep Blackpis  1970  51 y.o.  male  [facility]    Chief Complaint   Patient presents with    Depression    Anxiety     Did not require to take Ativan at all for past 2 months    Insomnia     Improved       [place]:   This is a 49 years old   male with past psychiatric history and treatment of anxiety disorder who was seen with his wife for the first time to establish care and medication management referred by his PCP Dr. Heather Ansari on October 9 when he decided to discontinue Celexa, try Zoloft, and try hydroxyzine. He was prescribed Ativan 1-2 mg for panic attacks and Celexa 20 mg daily by his PCP which he was taking for past 4 months. He was advised not to take Ativan except for while having a panic attack which is approximately once a month.     The patient and his wife  were seen last time on November 26 when he decided to gradually increase and maximize Zoloft, continue hydroxyzine, and was provided with Ativan 1 mg #15 without any refill to be only taking for severe panic attacks as he is actively smoking marijuana. He presented on time alone today and was observed to be much improved with brighter affect and reported feeling same.   He was alert awake oriented to time person and place and was calm and cooperative, polite, and pleasant during the interview. Wesly Bucio reported compliance with Zoloft 200 mg daily, takes Vistaril 50 mg at bedtime, and has not required Ativan since last seen.       Patient and his wife were provided with support and psychoeducation, and after discussing risk/benefits/expectations with treatment alternatives available, patient decided to gradually increase and maximize Zoloft 200, continue hydroxyzine 50 mg at bedtime, and will take Ativan up to 1mg only if urgently needed. Wesly Bucio was educated about CBT techniques and provided written instructions and provided names and contact information for providers in area.        SUBSTANCE USE HISTORY:   TOBACCO: Smoke cigarette for couple of years at the age 24 and quit his smoking. ALCOHOL: Drinks couple of beers a month and denies any abuse. CANNABIS: Smoke 1/4 gram of marijuana approximately per week (he smokes through pipe couple of pinches 10-15 times daily) for past 15 years. COCAINE, OPIOIDS, and OTHERS: Patient denies. MEDICAL HISTORY:    has a past medical history of Anxiety, Elevated cholesterol (4/5/2010), Migraine (4/5/2010), and Right inguinal hernia (12/7/2014). ALLERGIES:   Allergies   Allergen Reactions    Shellfish Derived Swelling     Lips swell       VITAL SIGNS:  BP (!) 127/91   Pulse 73   Ht 5' 6\" (1.676 m)   Wt 68 kg (150 lb)   BMI 24.21 kg/m²     Current Outpatient Medications   Medication Sig Dispense Refill    sertraline (ZOLOFT) 100 mg tablet Take 2 Tabs by mouth daily. 60 Tab 2    simvastatin (ZOCOR) 20 mg tablet TAKE ONE TABLET BY MOUTH NIGHTLY 30 Tab 0    hydrOXYzine pamoate (VISTARIL) 50 mg capsule TAKE 1 CAPSULE BY MOUTH TWICE A DAY 60 Cap 2    LORazepam (ATIVAN) 1 mg tablet Take 1-2 Tabs by mouth every eight (8) hours as needed for Anxiety. Max Daily Amount: 6 mg. 15 Tab 0    FLUCELVAX QUAD 6416-2916, PF, syrg injection INJECT 0.5ML INTRAMUSCULARLY ONCE  0    multivitamin (ONE A DAY) tablet Take 1 Tab by mouth daily. MENTAL STATUS EXAMINATION:   Patient is a 48 y.o. male [place]  who looks slightly older than his stated age. He was observed to be improved with brighter affect and much calm than his initial visit. Patient appearance is casually dressed with fair hygiene. Speech is regular rate and rhythm, fluent language, and thought process is linear, logical, and goal directed. Reported mood is better with mood congruent brighter affect. Patient denies any suicidal ideation, homicidal ideation, and auditory visual hallucination. Not observed to be delusional or paranoid.  Insight, judgement, reliability and impulse control is intact.  Cognition was within normal limit and patient was observed to be reliable. DIAGNOSIS:  Encounter Diagnoses   Name Primary?  Generalized anxiety disorder Yes    Panic attacks     Cannabis abuse        PLAN:  1. MEDICATION:   1. Patient report compliance with Zoloft 200 mg daily, is able to tolerate, and report marked improvement with his anxiety and some improvement with his sleep clearly noticeable by wife. He requested to continue current treatment plan and return in 3 months for reevaluation. 2.  Vistaril 50 mg at bedtime. He is taking every night with improved sleep. 3.  Ativan 1-2 mg only as needed for panic attack #15 with no refill. He has not required to take any for past 2 months. He was provided with psycho education, discussed risk/benefits, and expectations from medication changes. Patient agrees with plan.      2. PSYCHOTHERAPY: Patient was provided with supportive therapy, strongly encourage to seek psychotherapy.       3. MEDICAL CARE: Patient was strongly encourage to take their medical medications and follow up with their PCP on regular basis. He lost 3 pounds since last seen on November 26 and his weight today is 150 pounds. He denies any other medical illness at this time.     4. SUBSTANCE ABUSE CARE: Patient denies a smoking cigarette, drinking alcohol, accepted to smoke marijuana regularly several times a day about one fourth of a gram per week. 5. FOLLOW UP:   Follow-up Disposition:  Return in about 3 months (around 4/21/2019) for Medication management.

## 2019-02-15 DIAGNOSIS — E78.5 HYPERLIPIDEMIA LDL GOAL <130: ICD-10-CM

## 2019-02-15 RX ORDER — SIMVASTATIN 20 MG/1
TABLET, FILM COATED ORAL
Qty: 30 TAB | Refills: 0 | Status: SHIPPED | OUTPATIENT
Start: 2019-02-15 | End: 2019-04-15 | Stop reason: SDUPTHER

## 2019-04-11 ENCOUNTER — TELEPHONE (OUTPATIENT)
Dept: FAMILY MEDICINE CLINIC | Age: 49
End: 2019-04-11

## 2019-04-11 NOTE — TELEPHONE ENCOUNTER
----- Message from Nena Richard sent at 4/11/2019 11:28 AM EDT -----  Regarding: Dr. Ashlee Chaparro , pt's wife requesting a call from the office in regards to the pt needing an appt as soon as possible for a med check. She stated that the pt is completely out of his \"Simbastatin\" rx and does needs to be seen to get a refill. Best contact [phone].      Outbound call to pt, LVM to call back to see there providers,

## 2019-04-11 NOTE — TELEPHONE ENCOUNTER
[phone] spoke to Charlotte Alarcon notified of patient appointment 4/15/2019 at 8:15a Kimberly priest

## 2019-04-15 ENCOUNTER — OFFICE VISIT (OUTPATIENT)
Dept: FAMILY MEDICINE CLINIC | Age: 49
End: 2019-04-15

## 2019-04-15 VITALS
RESPIRATION RATE: 16 BRPM | OXYGEN SATURATION: 99 % | SYSTOLIC BLOOD PRESSURE: 114 MMHG | WEIGHT: 144 LBS | HEART RATE: 81 BPM | TEMPERATURE: 98 F | HEIGHT: 66 IN | BODY MASS INDEX: 23.14 KG/M2 | DIASTOLIC BLOOD PRESSURE: 72 MMHG

## 2019-04-15 DIAGNOSIS — F41.9 ANXIETY: ICD-10-CM

## 2019-04-15 DIAGNOSIS — E78.5 HYPERLIPIDEMIA LDL GOAL <130: ICD-10-CM

## 2019-04-15 DIAGNOSIS — F41.0 PANIC ATTACKS: ICD-10-CM

## 2019-04-15 RX ORDER — SIMVASTATIN 20 MG/1
TABLET, FILM COATED ORAL
Qty: 30 TAB | Refills: 2 | Status: SHIPPED | OUTPATIENT
Start: 2019-04-15 | End: 2019-07-23 | Stop reason: SDUPTHER

## 2019-04-15 RX ORDER — HYDROXYZINE PAMOATE 50 MG/1
CAPSULE ORAL
Qty: 60 CAP | Refills: 2
Start: 2019-04-15 | End: 2019-05-30 | Stop reason: SDUPTHER

## 2019-04-15 NOTE — PATIENT INSTRUCTIONS
Learning About High Cholesterol  What is high cholesterol? Cholesterol is a type of fat in your blood. It is needed for many body functions, such as making new cells. Cholesterol is made by your body. It also comes from food you eat. If you have too much cholesterol, it starts to build up in your arteries. This is called hardening of the arteries, or atherosclerosis. High cholesterol raises your risk of a heart attack and stroke. There are different types of cholesterol. LDL is the \"bad\" cholesterol. High LDL can raise your risk for heart disease, heart attack, and stroke. HDL is the \"good\" cholesterol. High HDL is linked with a lower risk for heart disease, heart attack, and stroke. Your cholesterol levels help your doctor find out your risk for having a heart attack or stroke. How can you prevent high cholesterol? A heart-healthy lifestyle can help you prevent high cholesterol. This lifestyle helps lower your risk for a heart attack and stroke. · Eat heart-healthy foods. ? Eat fruits, vegetables, whole grains (like oatmeal), dried beans and peas, nuts and seeds, soy products (like tofu), and fat-free or low-fat dairy products. ? Replace butter, margarine, and hydrogenated or partially hydrogenated oils with olive and canola oils. (Canola oil margarine without trans fat is fine.)  ? Replace red meat with fish, poultry, and soy protein (like tofu). ? Limit processed and packaged foods like chips, crackers, and cookies. · Be active. Exercise can improve your cholesterol level. Get at least 30 minutes of exercise on most days of the week. Walking is a good choice. You also may want to do other activities, such as running, swimming, cycling, or playing tennis or team sports. · Stay at a healthy weight. Lose weight if you need to. · Don't smoke. If you need help quitting, talk to your doctor about stop-smoking programs and medicines. These can increase your chances of quitting for good.   How is high cholesterol treated? The goal of treatment is to reduce your chances of having a heart attack or stroke. The goal is not to lower your cholesterol numbers only. · You may make lifestyle changes, such as eating healthy foods, not smoking, losing weight, and being more active. · You may have to take medicine. Follow-up care is a key part of your treatment and safety. Be sure to make and go to all appointments, and call your doctor if you are having problems. It's also a good idea to know your test results and keep a list of the medicines you take. Where can you learn more? Go to http://tricia-grupo.info/. Enter V897 in the search box to learn more about \"Learning About High Cholesterol. \"  Current as of: July 22, 2018  Content Version: 11.9  © 5959-0931 [employer]. Care instructions adapted under license by [employer] (which disclaims liability or warranty for this information). If you have questions about a medical condition or this instruction, always ask your healthcare professional. [address] any warranty or liability for your use of this information.

## 2019-04-15 NOTE — PROGRESS NOTES
HPI  Anny Chaparro 51 y.o. male  presents to the office today for cholesterol follow up and medication check. Presents with wife today in office. Blood pressure 114/72, pulse 81, temperature 98 °F (36.7 °C), temperature source Oral, resp. rate 16, height 5' 6\" (1.676 m), weight 144 lb (65.3 kg), SpO2 99 %. Body mass index is 23.24 kg/m². Chief Complaint   Patient presents with    Cholesterol Problem     NOT FASTING    Panic Attack      Hyperlipidemia: Lipid panel on 12/12/17 notable for total cholesterol 154, HDL 45, LDL 90, and triglycerides 96. Pt continues with simvastatin 20 mg/d; he notes he ran out of medication 2 weeks ago. Anxiety/Panic Attacks: Pt reports anxiety has been well controlled on Vistaril 50 mg/d, Zoloft 100 mg/BID, Ativan 1 mg/PRN. He has not needed Ativan since 11/2018. He is no longer following up with counselor. He had several sessions, but did not think it was beneficial. Wife notes if needed, he will go back to counselor, but does not think he will need it at this time. Pt wife notes due to their insurance, she is self pay. Pt does not want to change specialists at this time because he will have new insurance in the winter. Wife also inquires about holding on a majority of labs due to finances. Current Outpatient Medications   Medication Sig Dispense Refill    simvastatin (ZOCOR) 20 mg tablet TAKE ONE TABLET BY MOUTH NIGHTLY *NEEDS OFFICE VISIT FOR 90 DAY SUPPLY* 30 Tab 2    hydrOXYzine pamoate (VISTARIL) 50 mg capsule TAKE 1 CAPSULE BY MOUTH ONCE A DAY  Indications: anxious 60 Cap 2    sertraline (ZOLOFT) 100 mg tablet Take 2 Tabs by mouth daily. 60 Tab 2    LORazepam (ATIVAN) 1 mg tablet Take 1-2 Tabs by mouth every eight (8) hours as needed for Anxiety. Max Daily Amount: 6 mg. 15 Tab 0    multivitamin (ONE A DAY) tablet Take 1 Tab by mouth daily.        Allergies   Allergen Reactions    Shellfish Derived Swelling     Lips swell     Past Medical History: Diagnosis Date    Anxiety     Elevated cholesterol 2010    Migraine 2010    Right inguinal hernia 2014     Past Surgical History:   Procedure Laterality Date    ENDOSCOPY, COLON, DIAGNOSTIC   repeat in 5 years    Dr. Tony Schrader     Family History   Problem Relation Age of Onset    Cancer Father         colon     Social History     Tobacco Use    Smoking status: Former Smoker     Packs/day: 0.50     Years: 2.00     Pack years: 1.00     Last attempt to quit: 1993     Years since quittin.9    Smokeless tobacco: Never Used   Substance Use Topics    Alcohol use: Yes     Alcohol/week: 2.4 oz     Types: 4 Cans of beer per week     Comment: RARE        Review of Systems   Constitutional: Negative for chills and fever. HENT: Negative for hearing loss and tinnitus. Eyes: Negative for blurred vision and double vision. Respiratory: Negative for shortness of breath. Cardiovascular: Negative for chest pain and palpitations. Gastrointestinal: Negative for nausea and vomiting. Genitourinary: Negative for dysuria and frequency. Musculoskeletal: Negative for back pain and falls. Skin: Negative for itching and rash. Neurological: Negative for dizziness, loss of consciousness and headaches. Psychiatric/Behavioral: Negative for depression. The patient is not nervous/anxious. Physical Exam   Constitutional: He is oriented to person, place, and time. He appears well-developed and well-nourished. HENT:   Head: Normocephalic and atraumatic. Right Ear: External ear normal.   Left Ear: External ear normal.   Nose: Nose normal.   Mouth/Throat: Oropharynx is clear and moist.   Eyes: Conjunctivae and EOM are normal.   Neck: Normal range of motion. Neck supple. Cardiovascular: Normal rate, regular rhythm, normal heart sounds and intact distal pulses. Pulmonary/Chest: Effort normal and breath sounds normal.   Abdominal: Soft.  Bowel sounds are normal.   Genitourinary: Testes normal.   Musculoskeletal: Normal range of motion. Neurological: He is alert and oriented to person, place, and time. Skin: Skin is warm and dry. Psychiatric: He has a normal mood and affect. His behavior is normal. Judgment and thought content normal.   Nursing note and vitals reviewed. ASSESSMENT and PLAN  Diagnoses and all orders for this visit:    1. Hyperlipidemia LDL goal <130  Presumed stable, will assess levels in the next week, pt not fasting today in office. Send lab results via call or letter. He will continue with simvastatin 20 mg/d.   -     simvastatin (ZOCOR) 20 mg tablet; TAKE ONE TABLET BY MOUTH NIGHTLY *NEEDS OFFICE VISIT FOR 90 DAY SUPPLY*  -     METABOLIC PANEL, COMPREHENSIVE  -     LIPID PANEL    2. Anxiety  Stable and doing well on Vistaril 50 mg/d and Zoloft 100 mg/BID. Provided refill Vistaril today in office.  -     hydrOXYzine pamoate (VISTARIL) 50 mg capsule; TAKE 1 CAPSULE BY MOUTH ONCE A DAY  Indications: anxious    3. Panic attacks  Stable and doing well on Vistaril 50 mg/d and Zoloft 100 mg/BID. Provided refill Vistaril today in office.  -     hydrOXYzine pamoate (VISTARIL) 50 mg capsule; TAKE 1 CAPSULE BY MOUTH ONCE A DAY  Indications: anxious    Follow-up and Dispositions    · Return in about 8 months (around 12/16/2019). Medication risks/benefits/costs/interactions/alternatives discussed with patient. Advised patient to call back or return to office if symptoms worsen/change/persist.  If patient cannot reach us or should anything more severe/urgent arise he/she should proceed directly to the nearest emergency department. Discussed expected course/resolution/complications of diagnosis in detail with patient. Patient given a written after visit summary which includes her diagnoses, current medications and vitals. Patient expressed understanding with the diagnosis and plan.     Written by josi Isidro, as dictated by Candie Kahn M.D.    9:02 AM - 9:14 AM    Total time spent with the patient 12 minutes, greater than 50% of time spent counseling patient.

## 2019-04-15 NOTE — PROGRESS NOTES
Chief Complaint   Patient presents with    Cholesterol Problem     NOT FASTING    Panic Attack       Reviewed Record in preparation for visit and have obtained necessary documentation. Identified pt with two pt identifiers (Name @ )    There are no preventive care reminders to display for this patient. 1. Have you been to the ER, urgent care clinic since your last visit? Hospitalized since your last visit? no  2. Have you seen or consulted any other health care providers outside of the [address] [place] [address] since your last visit? Include any pap smears or colon screening. no

## 2019-04-21 LAB
ALBUMIN SERPL-MCNC: 4.1 G/DL (ref 3.5–5.5)
ALBUMIN/GLOB SERPL: 1.7 {RATIO} (ref 1.2–2.2)
ALP SERPL-CCNC: 74 IU/L (ref 39–117)
ALT SERPL-CCNC: 19 IU/L (ref 0–44)
AST SERPL-CCNC: 19 IU/L (ref 0–40)
BILIRUB SERPL-MCNC: <0.2 MG/DL (ref 0–1.2)
BUN SERPL-MCNC: 13 MG/DL (ref 6–24)
BUN/CREAT SERPL: 14 (ref 9–20)
CALCIUM SERPL-MCNC: 9.1 MG/DL (ref 8.7–10.2)
CHLORIDE SERPL-SCNC: 102 MMOL/L (ref 96–106)
CHOLEST SERPL-MCNC: 225 MG/DL (ref 100–199)
CO2 SERPL-SCNC: 24 MMOL/L (ref 20–29)
CREAT SERPL-MCNC: 0.92 MG/DL (ref 0.76–1.27)
GLOBULIN SER CALC-MCNC: 2.4 G/DL (ref 1.5–4.5)
GLUCOSE SERPL-MCNC: 97 MG/DL (ref 65–99)
HDLC SERPL-MCNC: 39 MG/DL
INTERPRETATION, 910389: NORMAL
LDLC SERPL CALC-MCNC: 151 MG/DL (ref 0–99)
POTASSIUM SERPL-SCNC: 4.4 MMOL/L (ref 3.5–5.2)
PROT SERPL-MCNC: 6.5 G/DL (ref 6–8.5)
SODIUM SERPL-SCNC: 140 MMOL/L (ref 134–144)
TRIGL SERPL-MCNC: 175 MG/DL (ref 0–149)
VLDLC SERPL CALC-MCNC: 35 MG/DL (ref 5–40)

## 2019-05-02 NOTE — PROGRESS NOTES
Cholesterol is poorly controlled    Is pt taking meds? If not needs too  If he is then we need to increase Zocor to 40 mg daily #30 2 refills and recheck cholesterol in 3 months.

## 2019-05-09 NOTE — PROGRESS NOTES
Westley Gay Wife Katja Pryor spoke to Ron Darby verified  notified of patients labs and understand per Kimberly patient wasn't taken his medication for 1 month prior to his office visit but he did start taking it now

## 2019-05-30 DIAGNOSIS — F41.9 ANXIETY: ICD-10-CM

## 2019-05-30 DIAGNOSIS — F41.0 PANIC ATTACKS: ICD-10-CM

## 2019-05-30 RX ORDER — HYDROXYZINE PAMOATE 50 MG/1
CAPSULE ORAL
Qty: 60 CAP | Refills: 0 | Status: SHIPPED | OUTPATIENT
Start: 2019-05-30 | End: 2019-06-18 | Stop reason: SDUPTHER

## 2019-06-18 ENCOUNTER — OFFICE VISIT (OUTPATIENT)
Dept: BEHAVIORAL/MENTAL HEALTH CLINIC | Age: 49
End: 2019-06-18

## 2019-06-18 VITALS
HEIGHT: 66 IN | DIASTOLIC BLOOD PRESSURE: 86 MMHG | WEIGHT: 145 LBS | SYSTOLIC BLOOD PRESSURE: 127 MMHG | HEART RATE: 66 BPM | BODY MASS INDEX: 23.3 KG/M2

## 2019-06-18 DIAGNOSIS — F12.10 CANNABIS ABUSE: ICD-10-CM

## 2019-06-18 DIAGNOSIS — F41.1 GENERALIZED ANXIETY DISORDER: Primary | ICD-10-CM

## 2019-06-18 DIAGNOSIS — F41.0 PANIC ATTACKS: ICD-10-CM

## 2019-06-18 DIAGNOSIS — F41.9 ANXIETY: ICD-10-CM

## 2019-06-18 RX ORDER — SERTRALINE HYDROCHLORIDE 100 MG/1
200 TABLET, FILM COATED ORAL DAILY
Qty: 180 TAB | Refills: 1 | Status: SHIPPED | OUTPATIENT
Start: 2019-06-18

## 2019-06-18 RX ORDER — LORAZEPAM 1 MG/1
1-2 TABLET ORAL
Qty: 15 TAB | Refills: 0 | Status: SHIPPED | OUTPATIENT
Start: 2019-06-18

## 2019-06-18 RX ORDER — HYDROXYZINE PAMOATE 50 MG/1
CAPSULE ORAL
Qty: 90 CAP | Refills: 1 | Status: SHIPPED | OUTPATIENT
Start: 2019-06-18

## 2019-06-18 NOTE — PROGRESS NOTES
Robyn Basilio Krysta  1970  47 y.o.  male  WHITE [place]     Chief Complaint   Patient presents with    Follow-up     Doing well       [place]:   This is a 51 years old   male with past psychiatric history and treatment of anxiety disorder who was seen with his wife for the first time to establish care and medication management referred by his PCP Dr. Petrona Azar on October 9 when he decided to discontinue Celexa, try Zoloft, and try hydroxyzine. He was prescribed Ativan 1-2 mg for panic attacks and Celexa 20 mg daily by his PCP which he was taking for past 4 months.  He was advised not to take Ativan except for while having a panic attack which is approximately once a month.     The patient and his wife were seen last time on January 21, 2019 when he decided to continue Zoloft 200 mg daily, hydroxyzine 50 mg at bedtime, and was Ativan 1 mg as needed for severe panic attacks. He presented on time  together with his wife today and was observed to be much improved with brighter affect and reported feeling same. He was alert awake oriented to time person and place and was calm and cooperative, polite, and pleasant during the interview.  He reported compliance with Zoloft 200 mg daily, takes Vistaril 50 mg at bedtime, and has only required Ativan  2-3 times since last seen.       SUBSTANCE USE HISTORY:   TOBACCO: Smoke cigarette for couple of years at the age 24 and quit his smoking. ALCOHOL: Drinks couple of beers a month and denies any abuse. CANNABIS: Smoke 1/4 gram of marijuana approximately per week (he smokes through pipe couple of pinches 10-15 times daily) for past 15 years. COCAINE, OPIOIDS, and OTHERS: Patient denies. MEDICAL HISTORY:    has a past medical history of Anxiety, Elevated cholesterol (4/5/2010), Migraine (4/5/2010), and Right inguinal hernia (12/7/2014).     ALLERGIES:   Allergies   Allergen Reactions    Shellfish Derived Swelling     Lips swell       VITAL SIGNS:  /86 Pulse 66   Ht 5' 6\" (1.676 m)   Wt 65.8 kg (145 lb)   BMI 23.40 kg/m²     Current Outpatient Medications   Medication Sig Dispense Refill    sertraline (ZOLOFT) 100 mg tablet Take 2 Tabs by mouth daily. Indications: panic disorder 180 Tab 1    hydrOXYzine pamoate (VISTARIL) 50 mg capsule TAKE 1 CAPSULE BY MOUTH ONCE A DAY  Indications: anxious 90 Cap 1    LORazepam (ATIVAN) 1 mg tablet Take 1-2 Tabs by mouth every eight (8) hours as needed for Anxiety. Max Daily Amount: 6 mg. Indications: anxious 15 Tab 0    simvastatin (ZOCOR) 20 mg tablet TAKE ONE TABLET BY MOUTH NIGHTLY *NEEDS OFFICE VISIT FOR 90 DAY SUPPLY* 30 Tab 2    multivitamin (ONE A DAY) tablet Take 1 Tab by mouth daily. MENTAL STATUS EXAMINATION:   Patient is a 51 y. o. male [place]ITE [place]  who looks slightly older than his stated age. He was observed to be improved with brighter affect and much calm than his initial visit. Patient appearance is casually dressed with fair hygiene. Speech is regular rate and rhythm, fluent language, and thought process is linear, logical, and goal directed. Reported mood is better with mood congruent brighter affect. Patient denies any suicidal ideation, homicidal ideation, and auditory visual hallucination. Not observed to be delusional or paranoid. Insight, judgement, reliability and impulse control is intact.  Cognition was within normal limit and patient was observed to be reliable. DIAGNOSIS:  Encounter Diagnoses   Name Primary?  Generalized anxiety disorder Yes    Anxiety     Panic attacks     Cannabis abuse        PLAN:  1. MEDICATION:   1.  Generalized anxiety and panic attacks: Zoloft 200 mg daily, is able to tolerate, and report marked improvement with his anxiety and some improvement with his sleep clearly noticeable by wife. He requested to continue current treatment plan and return in 6 months for reevaluation.       2.  Initial insomnia: Vistaril 50 mg at bedtime.   He is taking every night with improved sleep.     3.  Panic attacks: Ativan 1-2 mg only as needed for panic attack #15 with no refill. He was provided with psycho education, discussed risk/benefits, and expectations from medication changes. Patient agrees with plan.      2. PSYCHOTHERAPY: Patient was provided with supportive therapy, strongly encourage to seek psychotherapy.       3. MEDICAL CARE: Patient was strongly encourage to take their medical medications and follow up with their PCP on regular basis. His weight is a stable at 145 pounds, blood pressure is 127/86 and pulse is 66.      4. SUBSTANCE ABUSE CARE: Patient denies a smoking cigarette, drinking alcohol, accepted to smoke marijuana regularly several times a day about one fourth of a gram per week. 5. FOLLOW UP:   Follow-up and Dispositions    · Return in about 6 months (around 12/18/2019) for Medication management.

## 2019-07-23 DIAGNOSIS — E78.5 HYPERLIPIDEMIA LDL GOAL <130: ICD-10-CM

## 2019-07-25 RX ORDER — SIMVASTATIN 20 MG/1
TABLET, FILM COATED ORAL
Qty: 60 TAB | Refills: 1 | Status: SHIPPED | OUTPATIENT
Start: 2019-07-25 | End: 2019-11-22 | Stop reason: SDUPTHER

## 2019-08-22 ENCOUNTER — TELEPHONE (OUTPATIENT)
Dept: FAMILY MEDICINE CLINIC | Age: 49
End: 2019-08-22

## 2019-08-23 NOTE — TELEPHONE ENCOUNTER
Received voice message from brother of Mr Manuel Trujillo. Marnie Beck is having extreme anxiety attack since 15 o clock today. He has already taken 3 tablets of Zoloft 100 mg which is not helping. Reviewed his chart. He has h/o anxiety and panic attacks and is following Dr Sruthi Hernández. Called brother and talked to him and his wife . He has severe anxiety attack. He has already taken 3 tablets of Zoloft. Discussed with wife and brother that he is on prn q8h  Lorazepam and Hydroxyzine and he is supposed to take when he has panic attack or anxiety attack. As per wife,she has given him Lorazepam about one hour back. Recommended to give Hydroxyzine  Now and to give Lorazepam again before sleep. . If his symptoms don't improve , need to take him to ER as he may need IV Benzodiazepine. Brother and wife verbalized my recommendation and appreciated my call back.

## 2019-11-22 DIAGNOSIS — E78.5 HYPERLIPIDEMIA LDL GOAL <130: ICD-10-CM

## 2019-11-25 RX ORDER — SIMVASTATIN 20 MG/1
TABLET, FILM COATED ORAL
Qty: 60 TAB | Refills: 0 | Status: SHIPPED | OUTPATIENT
Start: 2019-11-25 | End: 2020-01-21

## 2019-12-13 ENCOUNTER — TELEPHONE (OUTPATIENT)
Dept: FAMILY MEDICINE CLINIC | Age: 49
End: 2019-12-13

## 2019-12-13 DIAGNOSIS — E78.5 HYPERLIPIDEMIA LDL GOAL <130: Primary | ICD-10-CM

## 2019-12-13 NOTE — TELEPHONE ENCOUNTER
[phone] spoke to Pierre notified of patients new appointment Monday, December 16, 2019 05:30 PM  And Per Dr Sharee Menjivar ok to order his labs notified Pierre can come in for labs only Saturday Kimberly priest

## 2019-12-13 NOTE — TELEPHONE ENCOUNTER
Pt.'s wife is calling requesting lab order for her  to check his cholesterol. She is requesting a call back to notify her when orders are in and ready. Pt. Had an upcoming appt.  On 01/15 @3pm.    Vianey Whatley call# [phone]

## 2019-12-15 LAB
ALBUMIN SERPL-MCNC: 4.2 G/DL (ref 3.5–5.5)
ALBUMIN/GLOB SERPL: 1.7 {RATIO} (ref 1.2–2.2)
ALP SERPL-CCNC: 74 IU/L (ref 39–117)
ALT SERPL-CCNC: 19 IU/L (ref 0–44)
AST SERPL-CCNC: 20 IU/L (ref 0–40)
BASOPHILS # BLD AUTO: 0.1 X10E3/UL (ref 0–0.2)
BASOPHILS NFR BLD AUTO: 1 %
BILIRUB SERPL-MCNC: 0.3 MG/DL (ref 0–1.2)
BUN SERPL-MCNC: 12 MG/DL (ref 6–24)
BUN/CREAT SERPL: 12 (ref 9–20)
CALCIUM SERPL-MCNC: 9.4 MG/DL (ref 8.7–10.2)
CHLORIDE SERPL-SCNC: 102 MMOL/L (ref 96–106)
CO2 SERPL-SCNC: 23 MMOL/L (ref 20–29)
CREAT SERPL-MCNC: 1.01 MG/DL (ref 0.76–1.27)
EOSINOPHIL # BLD AUTO: 0.3 X10E3/UL (ref 0–0.4)
EOSINOPHIL NFR BLD AUTO: 3 %
ERYTHROCYTE [DISTWIDTH] IN BLOOD BY AUTOMATED COUNT: 12.7 % (ref 12.3–15.4)
GLOBULIN SER CALC-MCNC: 2.5 G/DL (ref 1.5–4.5)
GLUCOSE SERPL-MCNC: 90 MG/DL (ref 65–99)
HCT VFR BLD AUTO: 44.3 % (ref 37.5–51)
HGB BLD-MCNC: 14.7 G/DL (ref 13–17.7)
IMM GRANULOCYTES # BLD AUTO: 0 X10E3/UL (ref 0–0.1)
IMM GRANULOCYTES NFR BLD AUTO: 0 %
LYMPHOCYTES # BLD AUTO: 1.7 X10E3/UL (ref 0.7–3.1)
LYMPHOCYTES NFR BLD AUTO: 20 %
MCH RBC QN AUTO: 29.5 PG (ref 26.6–33)
MCHC RBC AUTO-ENTMCNC: 33.2 G/DL (ref 31.5–35.7)
MCV RBC AUTO: 89 FL (ref 79–97)
MONOCYTES # BLD AUTO: 0.5 X10E3/UL (ref 0.1–0.9)
MONOCYTES NFR BLD AUTO: 6 %
NEUTROPHILS # BLD AUTO: 6.1 X10E3/UL (ref 1.4–7)
NEUTROPHILS NFR BLD AUTO: 70 %
PLATELET # BLD AUTO: 314 X10E3/UL (ref 150–450)
POTASSIUM SERPL-SCNC: 4.5 MMOL/L (ref 3.5–5.2)
PROT SERPL-MCNC: 6.7 G/DL (ref 6–8.5)
RBC # BLD AUTO: 4.98 X10E6/UL (ref 4.14–5.8)
SODIUM SERPL-SCNC: 141 MMOL/L (ref 134–144)
WBC # BLD AUTO: 8.7 X10E3/UL (ref 3.4–10.8)

## 2019-12-16 ENCOUNTER — OFFICE VISIT (OUTPATIENT)
Dept: FAMILY MEDICINE CLINIC | Age: 49
End: 2019-12-16

## 2019-12-16 VITALS
RESPIRATION RATE: 18 BRPM | DIASTOLIC BLOOD PRESSURE: 86 MMHG | TEMPERATURE: 98.6 F | BODY MASS INDEX: 24.46 KG/M2 | HEIGHT: 66 IN | SYSTOLIC BLOOD PRESSURE: 128 MMHG | HEART RATE: 78 BPM | WEIGHT: 152.2 LBS | OXYGEN SATURATION: 98 %

## 2019-12-16 DIAGNOSIS — F41.9 ANXIETY: Primary | ICD-10-CM

## 2019-12-16 DIAGNOSIS — F41.0 PANIC ATTACKS: ICD-10-CM

## 2019-12-16 DIAGNOSIS — E78.5 HYPERLIPIDEMIA LDL GOAL <130: ICD-10-CM

## 2019-12-16 RX ORDER — MIRTAZAPINE 15 MG/1
TABLET, FILM COATED ORAL
COMMUNITY
End: 2020-09-01

## 2019-12-16 NOTE — PROGRESS NOTES
Chief Complaint   Patient presents with    Follow-up     follow up lipids      1. Have you been to the ER, urgent care clinic since your last visit? Hospitalized since your last visit? No    2. Have you seen or consulted any other health care providers outside of the [address] [place] [address] since your last visit? Include any pap smears or colon screening.  No

## 2019-12-16 NOTE — PROGRESS NOTES
HPI  Andra Chaparro 47 y.o. male  presents to the office with his spouse today for follow up on lipids. Blood pressure 128/86, pulse 78, temperature 98.6 °F (37 °C), temperature source Oral, resp. rate 18, height 5' 6\" (1.676 m), weight 152 lb 3.2 oz (69 kg), SpO2 98 %. Body mass index is 24.57 kg/m². Chief Complaint   Patient presents with    Follow-up     follow up lipids         Anxiety/panic attacks: Pt's former psychiatrist Dr. Geo Nguyễn recently moved away, so he has been seeing Dr. Rosa Figueroa at [facility], who started pt on Remeron 15 mg x 2 weeks and stopped Visceril 50 mg. Wife reports pt having panic attacks on Thanksgiving and last night; wife thinks that pt got excited over their vacation which triggered the panic attack. Endorses that most panic attacks occur during night time. Wife thinks that current regimen of Remeron 15 mg, Zoloft 100 mg, and Ativan 1 mg is not working effectively, and wonders if pt could slowly wean off of Remeron and go back to Visceril. Hyperlipidemia: Lipid panel on 4/20/19 notable for total cholesterol 225, HDL 39, , and triglycerides 175. Pt continues with Simvastatin 20 mg/d. Current Outpatient Medications   Medication Sig Dispense Refill    mirtazapine (REMERON) 15 mg tablet Take  by mouth nightly.  simvastatin (ZOCOR) 20 mg tablet TAKE 1 TABLET BY MOUTH NIGHTLY. 60 Tab 0    sertraline (ZOLOFT) 100 mg tablet Take 2 Tabs by mouth daily. Indications: panic disorder 180 Tab 1    LORazepam (ATIVAN) 1 mg tablet Take 1-2 Tabs by mouth every eight (8) hours as needed for Anxiety. Max Daily Amount: 6 mg. Indications: anxious 15 Tab 0    multivitamin (ONE A DAY) tablet Take 1 Tab by mouth daily.       hydrOXYzine pamoate (VISTARIL) 50 mg capsule TAKE 1 CAPSULE BY MOUTH ONCE A DAY  Indications: anxious 90 Cap 1     Allergies   Allergen Reactions    Shellfish Derived Swelling     Lips swell     Past Medical History:   Diagnosis Date    Anxiety     Elevated cholesterol 2010    Migraine 2010    Right inguinal hernia 2014     Past Surgical History:   Procedure Laterality Date    ENDOSCOPY, COLON, DIAGNOSTIC   repeat in 5 years    Dr. Aloma Kussmaul     Family History   Problem Relation Age of Onset    Cancer Father         colon     Social History     Tobacco Use    Smoking status: Former Smoker     Packs/day: 0.50     Years: 2.00     Pack years: 1.00     Last attempt to quit: 1993     Years since quittin.6    Smokeless tobacco: Never Used   Substance Use Topics    Alcohol use: Yes     Alcohol/week: 4.0 standard drinks     Types: 4 Cans of beer per week     Comment: RARE        Review of Systems   Constitutional: Negative for chills and fever. HENT: Negative for hearing loss and tinnitus. Eyes: Negative for blurred vision and double vision. Respiratory: Negative for cough and shortness of breath. Cardiovascular: Negative for chest pain and palpitations. Gastrointestinal: Negative for nausea and vomiting. Genitourinary: Negative for dysuria and frequency. Musculoskeletal: Negative for back pain and falls. Skin: Negative for itching and rash. Neurological: Negative for dizziness, loss of consciousness and headaches. Psychiatric/Behavioral: Negative for depression. The patient is nervous/anxious. Physical Exam  Vitals signs and nursing note reviewed. Constitutional:       Appearance: Normal appearance. He is well-developed. HENT:      Head: Normocephalic and atraumatic. Right Ear: External ear normal.      Left Ear: External ear normal.      Nose: Nose normal.   Eyes:      Conjunctiva/sclera: Conjunctivae normal.      Pupils: Pupils are equal, round, and reactive to light. Neck:      Musculoskeletal: Normal range of motion and neck supple. Cardiovascular:      Rate and Rhythm: Normal rate and regular rhythm. Pulses: Normal pulses. Heart sounds: Normal heart sounds. Pulmonary:      Effort: Pulmonary effort is normal.      Breath sounds: Normal breath sounds. Abdominal:      General: Bowel sounds are normal.      Palpations: Abdomen is soft. Musculoskeletal: Normal range of motion. Skin:     General: Skin is warm and dry. Neurological:      Mental Status: He is alert and oriented to person, place, and time. Psychiatric:         Mood and Affect: Mood is anxious. Speech: Speech normal.         Behavior: Behavior normal.         Thought Content: Thought content normal.         Judgment: Judgment normal.           ASSESSMENT and PLAN  Diagnoses and all orders for this visit:    1. Anxiety  Provided referral to new psychiatrist Dr. Demetria Bonds. Cecy Chatterjee pt to see social work Chetan Tavarez as I believe pt will benefit from speaking to her. Advised pt that he could slowly wean off of Remeron by decrease dose over time and go back to Visceril. Advised pt to take 2 tabs of Ativan 1 mg if needed. -     REFERRAL TO PSYCHIATRY  -     REFERRAL TO SOCIAL WORK    2. Panic attacks  Provided referral to new psychiatrist Dr. Demetria Bonds. Cecy Chatterjee pt to see social work Chetan Tavarez as I believe pt will benefit from speaking to her. Advised pt that he could slowly wean off of Remeron by decrease dose over time and go back to Visceril. Advised pt to take 2 tabs of Ativan 1 mg if needed. -     REFERRAL TO PSYCHIATRY  -     REFERRAL TO SOCIAL WORK    3. Hyperlipidemia LDL goal <130  Last LDL not at goal. Will assess levels. Pt continues with Simvastatin 20 mg/d.   -     LIPID PANEL    Follow-up and Dispositions    · Return in about 6 months (around 6/16/2020) for hyperlipidemia follow up. Medication risks/benefits/costs/interactions/alternatives discussed with patient.   Advised patient to call back or return to office if symptoms worsen/change/persist.  If patient cannot reach us or should anything more severe/urgent arise he/she should proceed directly to the nearest emergency department. Discussed expected course/resolution/complications of diagnosis in detail with patient. Patient given a written after visit summary which includes her diagnoses, current medications and vitals. Patient expressed understanding with the diagnosis and plan. Written by josi Mccollum, as dictated by Maicol Rosenthal M.D.    6:50 PM - 7:10 PM    Total time spent with the patient 20 minutes, greater than 50% of time spent counseling patient.

## 2019-12-17 NOTE — PATIENT INSTRUCTIONS
Learning About High Cholesterol  What is high cholesterol? Cholesterol is a type of fat in your blood. It is needed for many body functions, such as making new cells. Cholesterol is made by your body. It also comes from food you eat. If you have too much cholesterol, it starts to build up in your arteries. This is called hardening of the arteries, or atherosclerosis. High cholesterol raises your risk of a heart attack and stroke. There are different types of cholesterol. LDL is the \"bad\" cholesterol. High LDL can raise your risk for heart disease, heart attack, and stroke. HDL is the \"good\" cholesterol. High HDL is linked with a lower risk for heart disease, heart attack, and stroke. Your cholesterol levels help your doctor find out your risk for having a heart attack or stroke. How can you prevent high cholesterol? A heart-healthy lifestyle can help you prevent high cholesterol. This lifestyle helps lower your risk for a heart attack and stroke. · Eat heart-healthy foods. ? Eat fruits, vegetables, whole grains (like oatmeal), dried beans and peas, nuts and seeds, soy products (like tofu), and fat-free or low-fat dairy products. ? Replace butter, margarine, and hydrogenated or partially hydrogenated oils with olive and canola oils. (Canola oil margarine without trans fat is fine.)  ? Replace red meat with fish, poultry, and soy protein (like tofu). ? Limit processed and packaged foods like chips, crackers, and cookies. · Be active. Exercise can improve your cholesterol level. Get at least 30 minutes of exercise on most days of the week. Walking is a good choice. You also may want to do other activities, such as running, swimming, cycling, or playing tennis or team sports. · Stay at a healthy weight. Lose weight if you need to. · Don't smoke. If you need help quitting, talk to your doctor about stop-smoking programs and medicines. These can increase your chances of quitting for good.   How is high cholesterol treated? The goal of treatment is to reduce your chances of having a heart attack or stroke. The goal is not to lower your cholesterol numbers only. · You may make lifestyle changes, such as eating healthy foods, not smoking, losing weight, and being more active. · You may have to take medicine. Follow-up care is a key part of your treatment and safety. Be sure to make and go to all appointments, and call your doctor if you are having problems. It's also a good idea to know your test results and keep a list of the medicines you take. Where can you learn more? Go to http://tricia-grupo.info/. Enter J090 in the search box to learn more about \"Learning About High Cholesterol. \"  Current as of: April 9, 2019  Content Version: 12.2  © 1807-7287 [employer]. Care instructions adapted under license by [employer] (which disclaims liability or warranty for this information). If you have questions about a medical condition or this instruction, always ask your healthcare professional. [address] any warranty or liability for your use of this information.

## 2019-12-25 LAB
ALBUMIN SERPL-MCNC: 4.4 G/DL (ref 3.5–5.5)
ALBUMIN/GLOB SERPL: 1.9 {RATIO} (ref 1.2–2.2)
ALP SERPL-CCNC: 74 IU/L (ref 39–117)
ALT SERPL-CCNC: 20 IU/L (ref 0–44)
AST SERPL-CCNC: 20 IU/L (ref 0–40)
BASOPHILS # BLD AUTO: 0.1 X10E3/UL (ref 0–0.2)
BASOPHILS NFR BLD AUTO: 1 %
BILIRUB SERPL-MCNC: 0.3 MG/DL (ref 0–1.2)
BUN SERPL-MCNC: 12 MG/DL (ref 6–24)
BUN/CREAT SERPL: 12 (ref 9–20)
CALCIUM SERPL-MCNC: 9.7 MG/DL (ref 8.7–10.2)
CHLORIDE SERPL-SCNC: 102 MMOL/L (ref 96–106)
CHOLEST SERPL-MCNC: 210 MG/DL (ref 100–199)
CO2 SERPL-SCNC: 24 MMOL/L (ref 20–29)
CREAT SERPL-MCNC: 1.01 MG/DL (ref 0.76–1.27)
EOSINOPHIL # BLD AUTO: 0.2 X10E3/UL (ref 0–0.4)
EOSINOPHIL NFR BLD AUTO: 3 %
ERYTHROCYTE [DISTWIDTH] IN BLOOD BY AUTOMATED COUNT: 12.5 % (ref 12.3–15.4)
GLOBULIN SER CALC-MCNC: 2.3 G/DL (ref 1.5–4.5)
GLUCOSE SERPL-MCNC: 92 MG/DL (ref 65–99)
HCT VFR BLD AUTO: 43.6 % (ref 37.5–51)
HDLC SERPL-MCNC: 46 MG/DL
HGB BLD-MCNC: 14.8 G/DL (ref 13–17.7)
IMM GRANULOCYTES # BLD AUTO: 0 X10E3/UL (ref 0–0.1)
IMM GRANULOCYTES NFR BLD AUTO: 0 %
INTERPRETATION, 910389: NORMAL
LDLC SERPL CALC-MCNC: 143 MG/DL (ref 0–99)
LYMPHOCYTES # BLD AUTO: 2 X10E3/UL (ref 0.7–3.1)
LYMPHOCYTES NFR BLD AUTO: 25 %
MCH RBC QN AUTO: 30.5 PG (ref 26.6–33)
MCHC RBC AUTO-ENTMCNC: 33.9 G/DL (ref 31.5–35.7)
MCV RBC AUTO: 90 FL (ref 79–97)
MONOCYTES # BLD AUTO: 0.7 X10E3/UL (ref 0.1–0.9)
MONOCYTES NFR BLD AUTO: 8 %
NEUTROPHILS # BLD AUTO: 5.1 X10E3/UL (ref 1.4–7)
NEUTROPHILS NFR BLD AUTO: 63 %
PLATELET # BLD AUTO: 324 X10E3/UL (ref 150–450)
POTASSIUM SERPL-SCNC: 4.2 MMOL/L (ref 3.5–5.2)
PROT SERPL-MCNC: 6.7 G/DL (ref 6–8.5)
RBC # BLD AUTO: 4.86 X10E6/UL (ref 4.14–5.8)
SODIUM SERPL-SCNC: 139 MMOL/L (ref 134–144)
TRIGL SERPL-MCNC: 107 MG/DL (ref 0–149)
VLDLC SERPL CALC-MCNC: 21 MG/DL (ref 5–40)
WBC # BLD AUTO: 8.1 X10E3/UL (ref 3.4–10.8)

## 2019-12-29 NOTE — TELEPHONE ENCOUNTER
Mr. Loyd Solis,  Cholesterol is looking better and HDL is better  The LDL needs tl still be less than 130  Keep an eye on the diet    Any questions let me know

## 2020-01-20 DIAGNOSIS — E78.5 HYPERLIPIDEMIA LDL GOAL <130: ICD-10-CM

## 2020-01-21 RX ORDER — SIMVASTATIN 20 MG/1
TABLET, FILM COATED ORAL
Qty: 60 TAB | Refills: 0 | Status: SHIPPED | OUTPATIENT
Start: 2020-01-21 | End: 2020-03-05

## 2020-03-04 DIAGNOSIS — E78.5 HYPERLIPIDEMIA LDL GOAL <130: ICD-10-CM

## 2020-03-05 RX ORDER — SIMVASTATIN 20 MG/1
TABLET, FILM COATED ORAL
Qty: 60 TAB | Refills: 0 | Status: SHIPPED | OUTPATIENT
Start: 2020-03-05 | End: 2020-05-14

## 2020-05-14 DIAGNOSIS — E78.5 HYPERLIPIDEMIA LDL GOAL <130: ICD-10-CM

## 2020-05-14 RX ORDER — SIMVASTATIN 20 MG/1
TABLET, FILM COATED ORAL
Qty: 60 TAB | Refills: 0 | Status: SHIPPED | OUTPATIENT
Start: 2020-05-14 | End: 2020-06-27

## 2020-06-24 DIAGNOSIS — E78.5 HYPERLIPIDEMIA LDL GOAL <130: ICD-10-CM

## 2020-06-27 RX ORDER — SIMVASTATIN 20 MG/1
TABLET, FILM COATED ORAL
Qty: 60 TAB | Refills: 0 | Status: SHIPPED | OUTPATIENT
Start: 2020-06-27 | End: 2020-09-01 | Stop reason: SDUPTHER

## 2020-09-01 ENCOUNTER — OFFICE VISIT (OUTPATIENT)
Dept: FAMILY MEDICINE CLINIC | Age: 50
End: 2020-09-01
Payer: COMMERCIAL

## 2020-09-01 ENCOUNTER — APPOINTMENT (OUTPATIENT)
Dept: FAMILY MEDICINE CLINIC | Age: 50
End: 2020-09-01

## 2020-09-01 VITALS
TEMPERATURE: 98.6 F | HEART RATE: 63 BPM | DIASTOLIC BLOOD PRESSURE: 89 MMHG | BODY MASS INDEX: 23.69 KG/M2 | SYSTOLIC BLOOD PRESSURE: 136 MMHG | RESPIRATION RATE: 18 BRPM | WEIGHT: 147.4 LBS | OXYGEN SATURATION: 100 % | HEIGHT: 66 IN

## 2020-09-01 DIAGNOSIS — Z12.11 COLON CANCER SCREENING: ICD-10-CM

## 2020-09-01 DIAGNOSIS — Z23 ENCOUNTER FOR IMMUNIZATION: ICD-10-CM

## 2020-09-01 DIAGNOSIS — R03.0 TRANSIENT ELEVATED BLOOD PRESSURE: ICD-10-CM

## 2020-09-01 DIAGNOSIS — F41.9 ANXIETY: ICD-10-CM

## 2020-09-01 DIAGNOSIS — E78.5 HYPERLIPIDEMIA LDL GOAL <130: Primary | ICD-10-CM

## 2020-09-01 PROCEDURE — 90714 TD VACC NO PRESV 7 YRS+ IM: CPT | Performed by: FAMILY MEDICINE

## 2020-09-01 PROCEDURE — 99214 OFFICE O/P EST MOD 30 MIN: CPT | Performed by: FAMILY MEDICINE

## 2020-09-01 PROCEDURE — 90471 IMMUNIZATION ADMIN: CPT | Performed by: FAMILY MEDICINE

## 2020-09-01 RX ORDER — SIMVASTATIN 20 MG/1
TABLET, FILM COATED ORAL
Qty: 60 TAB | Refills: 5 | Status: SHIPPED | OUTPATIENT
Start: 2020-09-01 | End: 2021-09-11

## 2020-09-01 RX ORDER — ZOSTER VACCINE RECOMBINANT, ADJUVANTED 50 MCG/0.5
0.5 KIT INTRAMUSCULAR ONCE
Qty: 0.5 ML | Refills: 1 | Status: SHIPPED | OUTPATIENT
Start: 2020-09-01 | End: 2020-09-01

## 2020-09-01 NOTE — PROGRESS NOTES
HPI  Josué Chaparro 45 y.o. male  presents to the office today for a 6 month follow-up of chronic conditions. Blood pressure 136/89, pulse 63, temperature 98.6 °F (37 °C), temperature source Oral, resp. rate 18, height 5' 6\" (1.676 m), weight 147 lb 6.4 oz (66.9 kg), SpO2 100 %. Body mass index is 23.79 kg/m². Chief Complaint   Patient presents with    Follow-up     6 months        Elevated BP: BP at office today 152/101 and 136/89 on manual recheck. Hyperlipidemia: Lipid panel on 12/24/2019 notable for total cholesterol 210, HDL 46, , and triglycerides 107. Pt continues with Zocor 20 mg/day. Pt requests a refill of their medication, which I have granted. Anxiety/Panic Disorder : Pt continues with Remeron 15 mg/day, Zoloft 100 mg/BID, Vistaril 50 mg/day, and Lorazepam 1 mg PRN. Health maintenance: Pt is due for a colonoscopy; I have placed orders for him to be seen by Dr. Torie Krueger for further evaluation. Pt has not received his T-DAP vaccination since 2010; we will administer a booster during today's OV. Provided pt with prescription and will follow up with local pharmacy for Shingrix vaccine. Pt states that he has been compliant with his medication. I have placed orders for pt to have labwork performed at a local [facility] for updated labwork as well. Current Outpatient Medications   Medication Sig Dispense Refill    simvastatin (ZOCOR) 20 mg tablet TAKE ONE TABLET BY MOUTH ONCE NIGHTLY 60 Tab 5    sertraline (ZOLOFT) 100 mg tablet Take 2 Tabs by mouth daily. Indications: panic disorder 180 Tab 1    hydrOXYzine pamoate (VISTARIL) 50 mg capsule TAKE 1 CAPSULE BY MOUTH ONCE A DAY  Indications: anxious 90 Cap 1    LORazepam (ATIVAN) 1 mg tablet Take 1-2 Tabs by mouth every eight (8) hours as needed for Anxiety. Max Daily Amount: 6 mg.  Indications: anxious 15 Tab 0     Allergies   Allergen Reactions    Shellfish Derived Swelling     Lips swell     Past Medical History:   Diagnosis Date  Anxiety     Elevated cholesterol 2010    Migraine 2010    Right inguinal hernia 2014     Past Surgical History:   Procedure Laterality Date    ENDOSCOPY, COLON, DIAGNOSTIC   repeat in 5 years    Dr. Rajni Lake     Family History   Problem Relation Age of Onset    Cancer Father         colon     Social History     Tobacco Use    Smoking status: Former Smoker     Packs/day: 0.50     Years: 2.00     Pack years: 1.00     Last attempt to quit: 1993     Years since quittin.3    Smokeless tobacco: Never Used   Substance Use Topics    Alcohol use: Yes     Alcohol/week: 4.0 standard drinks     Types: 4 Cans of beer per week     Comment: RARE        Review of Systems   Constitutional: Negative for chills and fever. HENT: Negative for hearing loss and tinnitus. Eyes: Negative for blurred vision and double vision. Respiratory: Negative for cough and shortness of breath. Cardiovascular: Negative for chest pain and palpitations. Gastrointestinal: Negative for nausea and vomiting. Genitourinary: Negative for dysuria and frequency. Musculoskeletal: Negative for back pain and falls. Skin: Negative for itching and rash. Neurological: Negative for dizziness, loss of consciousness and headaches. Endo/Heme/Allergies: Negative. Psychiatric/Behavioral: Negative for depression. The patient is not nervous/anxious. Physical Exam  Vitals signs reviewed. Constitutional:       Appearance: Normal appearance. HENT:      Head: Normocephalic and atraumatic. Right Ear: Tympanic membrane, ear canal and external ear normal.      Left Ear: Tympanic membrane, ear canal and external ear normal.      Nose: Nose normal.      Mouth/Throat:      Mouth: Mucous membranes are moist.      Pharynx: Oropharynx is clear. Eyes:      Extraocular Movements: Extraocular movements intact.       Conjunctiva/sclera: Conjunctivae normal.      Pupils: Pupils are equal, round, and reactive to light.   Neck:      Musculoskeletal: Normal range of motion and neck supple. Cardiovascular:      Rate and Rhythm: Normal rate and regular rhythm. Pulses: Normal pulses. Heart sounds: Normal heart sounds. Pulmonary:      Effort: Pulmonary effort is normal.      Breath sounds: Normal breath sounds. Abdominal:      General: Abdomen is flat. Bowel sounds are normal.      Palpations: Abdomen is soft. Musculoskeletal: Normal range of motion. Skin:     General: Skin is warm and dry. Neurological:      General: No focal deficit present. Mental Status: He is alert and oriented to person, place, and time. Psychiatric:         Mood and Affect: Mood normal.         Behavior: Behavior normal.         Judgment: Judgment normal.           ASSESSMENT and PLAN  Diagnoses and all orders for this visit:    1. Hyperlipidemia LDL goal <130  Lipid panel on 12/24/2019 notable for total cholesterol 210, HDL 46, , and triglycerides 107. LDL is not at goal. Pt continues with Zocor 20 mg/day. Pt requests a refill of their medication, which I have granted. -     simvastatin (ZOCOR) 20 mg tablet; TAKE ONE TABLET BY MOUTH ONCE NIGHTLY  -     METABOLIC PANEL, COMPREHENSIVE; Future  -     LIPID PANEL; Future    2. Anxiety  Pt continues with Remeron 15 mg/day, Zoloft 100 mg/BID, Vistaril 50 mg/day, and Lorazepam 1 mg PRN. 3. Transient elevated blood pressure  BP at office today 152/101 and 136/89 on manual recheck. 4. Colon cancer screening  Pt is due for a colonoscopy; I have placed orders for him to be seen by Dr. Nelly Cohen for further evaluation.  -     REFERRAL TO GASTROENTEROLOGY  -      COLONOSCOPY    5. Encounter for immunization  Pt has not received his T-DAP vaccination since 2010; we will administer a booster during today's OV. Provided pt with prescription and will follow up with local pharmacy for Shingrix vaccine.   -     TETANUS AND DIPHTHERIA TOXOIDS (TD) ADSORBED, PRES.  FREE, IN INDIVIDS. >=7, IM  -     varicella-zoster recombinant, PF, (Shingrix, PF,) 50 mcg/0.5 mL susr injection; 0.5 mL by IntraMUSCular route once for 1 dose. Follow-up and Dispositions    · Return in about 6 months (around 3/1/2021) for hyperlipidemia follow up. Medication risks/benefits/costs/interactions/alternatives discussed with patient. Advised patient to call back or return to office if symptoms worsen/change/persist.  If patient cannot reach us or should anything more severe/urgent arise he/she should proceed directly to the nearest emergency department. Discussed expected course/resolution/complications of diagnosis in detail with patient. Patient given a written after visit summary which includes diagnoses, current medications and vitals. Patient expressed understanding with the diagnosis and plan. Written by josi Luke, as dictated by Anil Laird M.D.    9:18 AM - 9:31 AM    Total time spent with the patient 13 minutes, greater than 50% of time spent counseling patient.

## 2020-09-01 NOTE — PROGRESS NOTES
Chief Complaint   Patient presents with    Follow-up     6 months     3 most recent PHQ Screens 9/1/2020   Little interest or pleasure in doing things Not at all   Feeling down, depressed, irritable, or hopeless Not at all   Total Score PHQ 2 0   Trouble falling or staying asleep, or sleeping too much -   Feeling tired or having little energy -   Poor appetite, weight loss, or overeating -   Feeling bad about yourself - or that you are a failure or have let yourself or your family down -   Trouble concentrating on things such as school, work, reading, or watching TV -   Moving or speaking so slowly that other people could have noticed; or the opposite being so fidgety that others notice -   Thoughts of being better off dead, or hurting yourself in some way -   PHQ 9 Score -   How difficult have these problems made it for you to do your work, take care of your home and get along with others -     Abuse Screening Questionnaire 9/1/2020   Do you ever feel afraid of your partner? N   Are you in a relationship with someone who physically or mentally threatens you? N   Is it safe for you to go home? Y     Visit Vitals  BP (!) 168/107 (BP 1 Location: Left arm, BP Patient Position: Sitting)   Pulse 63   Temp 98.6 °F (37 °C) (Oral)   Resp 18   Ht 5' 6\" (1.676 m)   Wt 147 lb 6.4 oz (66.9 kg)   SpO2 100%   BMI 23.79 kg/m²     1. Have you been to the ER, urgent care clinic since your last visit? Hospitalized since your last visit?no    2. Have you seen or consulted any other health care providers outside of the [address] [place] [address] since your last visit? Include any pap smears or colon screening. no

## 2021-09-10 DIAGNOSIS — E78.5 HYPERLIPIDEMIA LDL GOAL <130: ICD-10-CM

## 2021-09-11 RX ORDER — SIMVASTATIN 20 MG/1
TABLET, FILM COATED ORAL
Qty: 30 TABLET | Refills: 0 | Status: SHIPPED | OUTPATIENT
Start: 2021-09-11 | End: 2021-10-21

## 2021-10-14 DIAGNOSIS — R03.0 TRANSIENT ELEVATED BLOOD PRESSURE: Primary | ICD-10-CM

## 2021-10-14 DIAGNOSIS — E78.5 HYPERLIPIDEMIA LDL GOAL <130: ICD-10-CM

## 2021-10-20 DIAGNOSIS — E78.5 HYPERLIPIDEMIA LDL GOAL <130: ICD-10-CM

## 2021-10-21 DIAGNOSIS — E78.5 HYPERLIPIDEMIA LDL GOAL <130: ICD-10-CM

## 2021-10-21 DIAGNOSIS — E78.5 HYPERLIPIDEMIA LDL GOAL <130: Primary | ICD-10-CM

## 2021-10-21 DIAGNOSIS — R03.0 TRANSIENT ELEVATED BLOOD PRESSURE: ICD-10-CM

## 2021-10-21 RX ORDER — SIMVASTATIN 20 MG/1
20 TABLET, FILM COATED ORAL
Qty: 30 TABLET | Refills: 1 | Status: SHIPPED
Start: 2021-10-21 | End: 2021-11-22 | Stop reason: ALTCHOICE

## 2021-10-21 RX ORDER — SIMVASTATIN 20 MG/1
TABLET, FILM COATED ORAL
Qty: 30 TABLET | Refills: 0 | Status: SHIPPED
Start: 2021-10-21 | End: 2021-11-22 | Stop reason: ALTCHOICE

## 2021-10-21 NOTE — TELEPHONE ENCOUNTER
Lab slip has been faxed to [facility] for him to do his labs. Requesting refill on cholesterol med.   Patient had a Appointment [phone] but he cancelled it   Last OV 09.01.2020    No upcoming appointment

## 2021-10-27 LAB
ALBUMIN SERPL-MCNC: 4.4 G/DL (ref 3.8–4.9)
ALBUMIN/GLOB SERPL: 1.6 {RATIO} (ref 1.2–2.2)
ALP SERPL-CCNC: 86 IU/L (ref 44–121)
ALT SERPL-CCNC: 12 IU/L (ref 0–44)
AST SERPL-CCNC: 15 IU/L (ref 0–40)
BILIRUB SERPL-MCNC: 0.4 MG/DL (ref 0–1.2)
BUN SERPL-MCNC: 11 MG/DL (ref 6–24)
BUN/CREAT SERPL: 13 (ref 9–20)
CALCIUM SERPL-MCNC: 9.3 MG/DL (ref 8.7–10.2)
CHLORIDE SERPL-SCNC: 102 MMOL/L (ref 96–106)
CHOLEST SERPL-MCNC: 235 MG/DL (ref 100–199)
CO2 SERPL-SCNC: 29 MMOL/L (ref 20–29)
CREAT SERPL-MCNC: 0.84 MG/DL (ref 0.76–1.27)
ERYTHROCYTE [DISTWIDTH] IN BLOOD BY AUTOMATED COUNT: 12.6 % (ref 11.6–15.4)
GLOBULIN SER CALC-MCNC: 2.8 G/DL (ref 1.5–4.5)
GLUCOSE SERPL-MCNC: 99 MG/DL (ref 65–99)
HCT VFR BLD AUTO: 43.2 % (ref 37.5–51)
HDLC SERPL-MCNC: 41 MG/DL
HGB BLD-MCNC: 14.5 G/DL (ref 13–17.7)
IMP & REVIEW OF LAB RESULTS: NORMAL
LDLC SERPL CALC-MCNC: 168 MG/DL (ref 0–99)
MCH RBC QN AUTO: 29.5 PG (ref 26.6–33)
MCHC RBC AUTO-ENTMCNC: 33.6 G/DL (ref 31.5–35.7)
MCV RBC AUTO: 88 FL (ref 79–97)
PLATELET # BLD AUTO: 315 X10E3/UL (ref 150–450)
POTASSIUM SERPL-SCNC: 4.5 MMOL/L (ref 3.5–5.2)
PROT SERPL-MCNC: 7.2 G/DL (ref 6–8.5)
RBC # BLD AUTO: 4.91 X10E6/UL (ref 4.14–5.8)
SODIUM SERPL-SCNC: 141 MMOL/L (ref 134–144)
TRIGL SERPL-MCNC: 140 MG/DL (ref 0–149)
VLDLC SERPL CALC-MCNC: 26 MG/DL (ref 5–40)
WBC # BLD AUTO: 8.2 X10E3/UL (ref 3.4–10.8)

## 2021-11-08 NOTE — PROGRESS NOTES
Patient needs to have a virtual visit set up to discuss his labs he has not been seen in over 1 year please expedite the office visit.

## 2021-11-22 ENCOUNTER — VIRTUAL VISIT (OUTPATIENT)
Dept: FAMILY MEDICINE CLINIC | Age: 51
End: 2021-11-22
Payer: COMMERCIAL

## 2021-11-22 DIAGNOSIS — E78.5 HYPERLIPIDEMIA, UNSPECIFIED HYPERLIPIDEMIA TYPE: Primary | ICD-10-CM

## 2021-11-22 DIAGNOSIS — R53.81 MALAISE: ICD-10-CM

## 2021-11-22 PROCEDURE — 99213 OFFICE O/P EST LOW 20 MIN: CPT | Performed by: FAMILY MEDICINE

## 2021-11-22 RX ORDER — ROSUVASTATIN CALCIUM 10 MG/1
10 TABLET, COATED ORAL
Qty: 90 TABLET | Refills: 1 | Status: SHIPPED
Start: 2021-11-22 | End: 2022-03-06 | Stop reason: DRUGHIGH

## 2021-11-22 NOTE — PROGRESS NOTES
Gabrielle Chaparro is a 45 y.o. male who was seen by synchronous (real-time) audio-video technology on 11/22/2021. Consent:  Services were provided through a video synchronous discussion virtually to substitute for in-person appointment. He and/or his healthcare decision maker is aware that this patient-initiated Telehealth encounter is a billable service, with coverage as determined by his insurance carrier. He is aware that he may receive a bill and has provided verbal consent to proceed: Yes    I was in the office while conducting this encounter. Subjective:   Gabrielle Chaparro was seen for No chief complaint on file. Hyperlipidemia: Lipid panel on 10/26/21 notable for total cholesterol 235, HDL 41, , and triglycerides 140. Pt continues with Zocor 20mg take one tablet orally daily. I have took the pt off of the Zocor and have started him on Crestor 10mg take 1 tablet orally daily. I have also ordered a Coronary Calcium Score for the pt. Pt will have updated lab work performed in the future. Malaise: Pt reports that they have been experiencing increased fatigue and malaise throughout the day. I have placed orders for labwork to be performed to further assess what may be causing pt this discomfort. Insomnia: Pt reports that he does not sleep good at night. Pt reports that he takes sleeping medications to help him go to sleep and wanted to know if that can cause anxiety. I told the pt that it is a possibility. I have requested pt's wife to sleep with him for one night so she can see if he is snoring or not to evaluate for sleep apnea. Prior to Admission medications    Medication Sig Start Date End Date Taking? Authorizing Provider   rosuvastatin (CRESTOR) 10 mg tablet Take 1 Tablet by mouth nightly.  11/22/21  Yes Kyle Ocampo MD   simvastatin (ZOCOR) 20 mg tablet TAKE ONE TABLET BY MOUTH ONCE NIGHTLY 10/21/21 11/22/21 Yes Kyle Ocampo MD   simvastatin (ZOCOR) 20 mg tablet Take 1 Tablet by mouth nightly. 10/21/21 11/22/21 Yes Amber Hernandez MD   sertraline (ZOLOFT) 100 mg tablet Take 2 Tabs by mouth daily. Indications: panic disorder 19  Yes Kiara Tolbert MD   hydrOXYzine pamoate (VISTARIL) 50 mg capsule TAKE 1 CAPSULE BY MOUTH ONCE A DAY  Indications: anxious 19  Yes Kiara Tolbert MD   LORazepam (ATIVAN) 1 mg tablet Take 1-2 Tabs by mouth every eight (8) hours as needed for Anxiety. Max Daily Amount: 6 mg. Indications: anxious 19  Yes Kiara Tolbert MD     Allergies   Allergen Reactions    Shellfish Derived Swelling     Lips swell     Past Medical History:   Diagnosis Date    Anxiety     Elevated cholesterol 2010    Migraine 2010    Right inguinal hernia 2014     Past Surgical History:   Procedure Laterality Date    ENDOSCOPY, COLON, DIAGNOSTIC   repeat in 5 years    Dr. Chelsea Aaron     Family History   Problem Relation Age of Onset    Cancer Father         colon     Social History     Tobacco Use    Smoking status: Former Smoker     Packs/day: 0.50     Years: 2.00     Pack years: 1.00     Quit date: 1993     Years since quittin.5    Smokeless tobacco: Never Used   Substance Use Topics    Alcohol use: Yes     Alcohol/week: 4.0 standard drinks     Types: 4 Cans of beer per week     Comment: RARE        Review of Systems   Constitutional: Positive for malaise/fatigue. Negative for chills and fever. HENT: Negative for hearing loss and tinnitus. Eyes: Negative for blurred vision and double vision. Respiratory: Negative for cough and shortness of breath. Cardiovascular: Negative for chest pain and palpitations. Gastrointestinal: Negative for nausea and vomiting. Genitourinary: Negative for dysuria and frequency. Musculoskeletal: Negative for back pain and falls. Skin: Negative for itching and rash. Neurological: Negative for dizziness, loss of consciousness and headaches. Endo/Heme/Allergies: Negative. Psychiatric/Behavioral: Negative for depression. The patient is not nervous/anxious. PHYSICAL EXAMINATION:  Vital Signs: (As obtained by patient/caregiver at home)  There were no vitals taken for this visit. Constitutional: [x] Appears well-developed and well-nourished [x] No apparent distress      [] Abnormal -     Mental status: [x] Alert and awake  [x] Oriented to person/place/time [x] Able to follow commands    [] Abnormal -     Eyes:   EOM    [x]  Normal    [] Abnormal -   Sclera  [x]  Normal    [] Abnormal -          Discharge [x]  None visible   [] Abnormal -     HENT: [x] Normocephalic, atraumatic  [] Abnormal -   [x] Mouth/Throat: Mucous membranes are moist    External Ears [x] Normal  [] Abnormal -    Neck: [x] No visualized mass [] Abnormal -     Pulmonary/Chest: [x] Respiratory effort normal   [x] No visualized signs of difficulty breathing or respiratory distress        [] Abnormal -      Musculoskeletal:   [x] Normal gait with no signs of ataxia         [x] Normal range of motion of neck        [] Abnormal -     Neurological:        [x] No Facial Asymmetry (Cranial nerve 7 motor function) (limited exam due to video visit)          [x] No gaze palsy        [] Abnormal -          Skin:        [x] No significant exanthematous lesions or discoloration noted on facial skin         [] Abnormal -            Psychiatric:       [x] Normal Affect [] Abnormal -        [x] No Hallucinations    Other pertinent observable physical exam findings:-    Assessment & Plan:   Diagnoses and all orders for this visit:    1. Hyperlipidemia, unspecified hyperlipidemia type  Lipid panel on 10/26/21 notable for total cholesterol 235, HDL 41, , and triglycerides 140. Pt continues with Zocor 20mg take one tablet orally daily. I have took the pt off of the Zocor and have started him on Crestor 10mg take 1 tablet orally daily. I have also ordered a Coronary Calcium Score for the pt.  Pt will have updated lab work performed in the future. -     rosuvastatin (CRESTOR) 10 mg tablet; Take 1 Tablet by mouth nightly.  -     CT HEART W/O CONT WITH CALCIUM; Future  -     METABOLIC PANEL, COMPREHENSIVE; Future  -     NMR LIPOPROFILE WITH LIPIDS (WITHOUT GRAPH); Future    2. Malaise  Pt reports that they have been experiencing increased fatigue and malaise throughout the day. I have placed orders for labwork to be performed to further assess what may be causing pt this discomfort.   -     TSH 3RD GENERATION; Future  -     T4, FREE; Future              We discussed the expected course, resolution and complications of the diagnosis(es) in detail. Medication risks, benefits, costs, interactions, and alternatives were discussed as indicated. I advised her to contact the office if her condition worsens, changes or fails to improve as anticipated. She expressed understanding with the diagnosis(es) and plan. Pursuant to the emergency declaration under the [address] and [place] [address] waiver authority and the [employer] and [employer] Act, this Virtual Visit was conducted, with patient's consent, to reduce the patient's risk of exposure to COVID-19 and provide continuity of care for an established patient. Services were provided through a video synchronous discussion virtually to substitute for in-person clinic visit. Written by josi Boggs, as dictated by Alfonso Rincon M.D.    6:35 PM - 6:51 PM    Total time spent with the patient 16 minutes, greater than 50% of time spent counseling patient.

## 2021-12-17 ENCOUNTER — HOSPITAL ENCOUNTER (OUTPATIENT)
Dept: CT IMAGING | Age: 51
Discharge: HOME | End: 2021-12-17
Attending: FAMILY MEDICINE

## 2021-12-17 DIAGNOSIS — E78.5 HYPERLIPIDEMIA, UNSPECIFIED HYPERLIPIDEMIA TYPE: ICD-10-CM

## 2021-12-17 PROCEDURE — 75571 CT HRT W/O DYE W/CA TEST: CPT

## 2022-01-03 ENCOUNTER — VIRTUAL VISIT (OUTPATIENT)
Dept: FAMILY MEDICINE CLINIC | Age: 52
End: 2022-01-03
Payer: COMMERCIAL

## 2022-01-03 DIAGNOSIS — R93.1 AGATSTON CORONARY ARTERY CALCIUM SCORE GREATER THAN 400: Primary | ICD-10-CM

## 2022-01-03 DIAGNOSIS — E78.5 HYPERLIPIDEMIA, UNSPECIFIED HYPERLIPIDEMIA TYPE: ICD-10-CM

## 2022-01-03 PROCEDURE — 99213 OFFICE O/P EST LOW 20 MIN: CPT | Performed by: FAMILY MEDICINE

## 2022-01-03 NOTE — PROGRESS NOTES
Bernardo Chaparro is a 45 y.o. male who was seen by synchronous (real-time) audio-video technology on 1/3/2022. Consent:  Services were provided through a video synchronous discussion virtually to substitute for in-person appointment. He and/or his healthcare decision maker is aware that this patient-initiated Telehealth encounter is a billable service, with coverage as determined by his insurance carrier. He is aware that he may receive a bill and has provided verbal consent to proceed: Yes    I was  while conducting this encounter. Subjective:   Bernardo Chaparro was seen for No chief complaint on file. Coronary Artery Disease: Pt has scored a 433 for his Coronary Calcium Score which is evident of Coronary Artery Disease. His findings from the CT of his heart on 12/17/21 indicate, \"The coronary calcium in each vessel is as follows: Left main coronary artery: 22 Left anterior descending coronary artery: 325 Left circumflex coronary artery: 70 Right coronary artery: 3 Posterior descending coronary artery: 13\". After discussing with the pt's Coronary Calcium Score results, I have advised the pt to continue with his Crestor 10mg take 1 tablet orally daily medication and to also take baby aspirin whenever needed. Pt reports that he has been having pain in his left shoulder which was injured. Pt will have updated lab work performed in the future. I have also referred the pt to [address] [place] [address] Avenue, Cardiology. Hyperlipidemia: Lipid panel on 10/26/21 notable for total cholesterol 235, HDL 41, , and triglycerides 140. Pt continues with Crestor 10mg take 1 tablet orally daily. Prior to Admission medications    Medication Sig Start Date End Date Taking? Authorizing Provider   rosuvastatin (CRESTOR) 10 mg tablet Take 1 Tablet by mouth nightly. 11/22/21  Yes Talia Decker MD   sertraline (ZOLOFT) 100 mg tablet Take 2 Tabs by mouth daily.  Indications: panic disorder 6/18/19  Yes Tanna Hilliard MD   hydrOXYzine pamoate (VISTARIL) 50 mg capsule TAKE 1 CAPSULE BY MOUTH ONCE A DAY  Indications: anxious 19  Yes Marnie Olsen MD   LORazepam (ATIVAN) 1 mg tablet Take 1-2 Tabs by mouth every eight (8) hours as needed for Anxiety. Max Daily Amount: 6 mg. Indications: anxious 19  Yes Marnie Olsen MD     Allergies   Allergen Reactions    Shellfish Derived Swelling     Lips swell     Past Medical History:   Diagnosis Date    Anxiety     Elevated cholesterol 2010    Migraine 2010    Right inguinal hernia 2014     Past Surgical History:   Procedure Laterality Date    ENDOSCOPY, COLON, DIAGNOSTIC   repeat in 5 years    Dr. Lindsay Coleman     Family History   Problem Relation Age of Onset    Cancer Father         colon     Social History     Tobacco Use    Smoking status: Former Smoker     Packs/day: 0.50     Years: 2.00     Pack years: 1.00     Quit date: 1993     Years since quittin.7    Smokeless tobacco: Never Used   Substance Use Topics    Alcohol use: Yes     Alcohol/week: 4.0 standard drinks     Types: 4 Cans of beer per week     Comment: RARE        Review of Systems   Constitutional: Negative for chills and fever. HENT: Negative for hearing loss and tinnitus. Eyes: Negative for blurred vision and double vision. Respiratory: Negative for cough and shortness of breath. Cardiovascular: Negative for chest pain and palpitations. Gastrointestinal: Negative for nausea and vomiting. Genitourinary: Negative for dysuria and frequency. Musculoskeletal: Negative for back pain and falls. Skin: Negative for itching and rash. Neurological: Negative for dizziness, loss of consciousness and headaches. Endo/Heme/Allergies: Negative. Psychiatric/Behavioral: Negative for depression. The patient is not nervous/anxious. PHYSICAL EXAMINATION:  Vital Signs: (As obtained by patient/caregiver at home)  There were no vitals taken for this visit.      Constitutional: [x] Appears well-developed and well-nourished [x] No apparent distress      [] Abnormal -     Mental status: [x] Alert and awake  [x] Oriented to person/place/time [x] Able to follow commands    [] Abnormal -     Eyes:   EOM    [x]  Normal    [] Abnormal -   Sclera  [x]  Normal    [] Abnormal -          Discharge [x]  None visible   [] Abnormal -     HENT: [x] Normocephalic, atraumatic  [] Abnormal -   [x] Mouth/Throat: Mucous membranes are moist    External Ears [x] Normal  [] Abnormal -    Neck: [x] No visualized mass [] Abnormal -     Pulmonary/Chest: [x] Respiratory effort normal   [x] No visualized signs of difficulty breathing or respiratory distress        [] Abnormal -      Musculoskeletal:   [x] Normal gait with no signs of ataxia         [x] Normal range of motion of neck        [] Abnormal -     Neurological:        [x] No Facial Asymmetry (Cranial nerve 7 motor function) (limited exam due to video visit)          [x] No gaze palsy        [] Abnormal -          Skin:        [x] No significant exanthematous lesions or discoloration noted on facial skin         [] Abnormal -            Psychiatric:       [x] Normal Affect [] Abnormal -        [x] No Hallucinations    Other pertinent observable physical exam findings:-    Assessment & Plan:   Diagnoses and all orders for this visit:    1. Agatston coronary artery calcium score greater than 400  Pt has scored a 433 for his Coronary Calcium Score which is evident of Coronary Artery Disease. His findings from the CT of his heart on 12/17/21 indicate, \"The coronary calcium in each vessel is as follows: Left main coronary artery: 22 Left anterior descending coronary artery: 325 Left circumflex coronary artery: 70 Right coronary artery: 3 Posterior descending coronary artery: 13\".  After discussing with the pt's Coronary Calcium Score results, I have advised the pt to continue with his Crestor 10mg take 1 tablet orally daily medication and to also take baby aspirin whenever needed. Pt reports that he has been having pain in his left shoulder which was injured. Pt will have updated lab work performed in the future. I have also referred the pt to [address], Cardiology.  -     REFERRAL TO CARDIOLOGY    2. Hyperlipidemia, unspecified hyperlipidemia type   Lipid panel on 10/26/21 notable for total cholesterol 235, HDL 41, , and triglycerides 140. Pt continues with Crestor 10mg take 1 tablet orally daily. Follow-up and Dispositions    · Return in about 6 months (around 7/3/2022). We discussed the expected course, resolution and complications of the diagnosis(es) in detail. Medication risks, benefits, costs, interactions, and alternatives were discussed as indicated. I advised her to contact the office if her condition worsens, changes or fails to improve as anticipated. She expressed understanding with the diagnosis(es) and plan. Pursuant to the emergency declaration under the [address]  and [place] [address] waiver authority and the [employer] [place] [employer] and [employer] Act, this Virtual Visit was conducted, with patient's consent, to reduce the patient's risk of exposure to COVID-19 and provide continuity of care for an established patient. Services were provided through a video synchronous discussion virtually to substitute for in-person clinic visit. Written by josi Garcia, as dictated by Jessie Dodd M.D.    12:33 PM - 12:42 PM    Total time spent with the patient 9 minutes, greater than 50% of time spent counseling patient.

## 2022-01-04 ENCOUNTER — TELEPHONE (OUTPATIENT)
Dept: FAMILY MEDICINE CLINIC | Age: 52
End: 2022-01-04

## 2022-01-04 NOTE — TELEPHONE ENCOUNTER
Called pt and advised that we need to schedule an appointment to discuss CT results. Advised to please call scheduling back.

## 2022-01-04 NOTE — TELEPHONE ENCOUNTER
----- Message from James Neville MD sent at 1/1/2022 11:59 PM EST -----  Please set pt up for a virtual visit ASAP

## 2022-02-03 LAB — SARS-COV-2, NAA: POSITIVE

## 2022-03-03 LAB
ALBUMIN SERPL-MCNC: 4.4 G/DL (ref 3.8–4.9)
ALBUMIN/GLOB SERPL: 1.6 {RATIO} (ref 1.2–2.2)
ALP SERPL-CCNC: 85 IU/L (ref 44–121)
ALT SERPL-CCNC: 15 IU/L (ref 0–44)
AST SERPL-CCNC: 14 IU/L (ref 0–40)
BILIRUB SERPL-MCNC: 0.3 MG/DL (ref 0–1.2)
BUN SERPL-MCNC: 12 MG/DL (ref 6–24)
BUN/CREAT SERPL: 14 (ref 9–20)
CALCIUM SERPL-MCNC: 9.4 MG/DL (ref 8.7–10.2)
CHLORIDE SERPL-SCNC: 103 MMOL/L (ref 96–106)
CHOLEST SERPL-MCNC: 199 MG/DL (ref 100–199)
CO2 SERPL-SCNC: 24 MMOL/L (ref 20–29)
CREAT SERPL-MCNC: 0.86 MG/DL (ref 0.76–1.27)
EGFR: 104 ML/MIN/1.73
GLOBULIN SER CALC-MCNC: 2.7 G/DL (ref 1.5–4.5)
GLUCOSE SERPL-MCNC: 94 MG/DL (ref 65–99)
HDL SERPL-SCNC: 32 UMOL/L
HDLC SERPL-MCNC: 48 MG/DL
IMP & REVIEW OF LAB RESULTS: NORMAL
INTERPRETATION: NORMAL
LDL SERPL QN: 20.4 NM
LDL SERPL-SCNC: 1909 NMOL/L
LDL SMALL SERPL-SCNC: 1151 NMOL/L
LDLC SERPL CALC-MCNC: 134 MG/DL (ref 0–99)
LP-IR SCORE SERPL: 56
POTASSIUM SERPL-SCNC: 4.9 MMOL/L (ref 3.5–5.2)
PROT SERPL-MCNC: 7.1 G/DL (ref 6–8.5)
SODIUM SERPL-SCNC: 142 MMOL/L (ref 134–144)
TRIGL SERPL-MCNC: 93 MG/DL (ref 0–149)

## 2022-03-06 DIAGNOSIS — E78.5 HYPERLIPIDEMIA, UNSPECIFIED HYPERLIPIDEMIA TYPE: Primary | ICD-10-CM

## 2022-03-06 RX ORDER — ROSUVASTATIN CALCIUM 20 MG/1
20 TABLET, COATED ORAL
Qty: 90 TABLET | Refills: 1 | Status: SHIPPED | OUTPATIENT
Start: 2022-03-06 | End: 2022-10-15

## 2022-03-06 NOTE — PROGRESS NOTES
,  I hope you are well I want to go ahead and increase her Crestor from 10 to 20 mg clinic and the particle size down some more. if you have any questions let me know. With what you are taking currently with Crestor 10 just double up on the tablet and please remember to take it at night.     If you have any questions let me know

## 2022-10-14 DIAGNOSIS — E78.5 HYPERLIPIDEMIA, UNSPECIFIED HYPERLIPIDEMIA TYPE: ICD-10-CM

## 2022-10-14 NOTE — TELEPHONE ENCOUNTER
Last Visit: [facility] 1/3/22 MD Hassan, lipid 10/2021  Next Appointment: None  Previous Refill Encounter(s): 3/6/22 90 + 1    Requested Prescriptions     Pending Prescriptions Disp Refills    rosuvastatin (CRESTOR) 20 mg tablet [Pharmacy Med Name: ROSUVASTATIN CALCIUM 20 MG TAB] 90 Tablet 0     Sig: TAKE ONE TABLET BY MOUTH ONCE NIGHTLY     For Pharmacy Admin Tracking Only    CPA in place:   Recommendation Provided To:    Intervention Detail: New Rx: 1, reason: Patient Preference  Gap Closed?:   Intervention Accepted By:   Time Spent (min): 5

## 2022-10-15 RX ORDER — ROSUVASTATIN CALCIUM 20 MG/1
TABLET, COATED ORAL
Qty: 90 TABLET | Refills: 0 | Status: SHIPPED | OUTPATIENT
Start: 2022-10-15

## 2023-03-17 ENCOUNTER — TELEPHONE (OUTPATIENT)
Dept: FAMILY MEDICINE CLINIC | Age: 53
End: 2023-03-17

## 2023-03-17 NOTE — TELEPHONE ENCOUNTER
Patients spouse called in regards to getting some medication refilled, I set the patient up with an appointment for 5/9/22 for a medication refill incosvaldo Hassan would like to see him before just prescribing it. The medication he needs refilled are zoloft 200mg, and hydroxyzine pamoate. She is requesting a call back.     Best call back number: [phone]

## 2023-03-20 NOTE — TELEPHONE ENCOUNTER
ATR/UTR/LMOVM    Advised DR. Hassan has not been the prescriber for these two meds. Advised he will need to see DR. Hassan or a Partner in order to have these meds ordered. No visit in over a year.

## 2023-05-16 ENCOUNTER — TELEMEDICINE (OUTPATIENT)
Age: 53
End: 2023-05-16

## 2023-05-16 DIAGNOSIS — E78.2 MIXED HYPERLIPIDEMIA: Primary | ICD-10-CM

## 2023-05-16 DIAGNOSIS — Z11.59 NEED FOR HEPATITIS C SCREENING TEST: ICD-10-CM

## 2023-05-16 DIAGNOSIS — Z11.4 SCREENING FOR HIV WITHOUT PRESENCE OF RISK FACTORS: ICD-10-CM

## 2023-05-16 DIAGNOSIS — F41.9 ANXIETY: ICD-10-CM

## 2023-05-16 PROCEDURE — 99214 OFFICE O/P EST MOD 30 MIN: CPT | Performed by: FAMILY MEDICINE

## 2023-05-16 RX ORDER — ROSUVASTATIN CALCIUM 20 MG/1
TABLET, COATED ORAL
Qty: 90 TABLET | Refills: 1 | Status: SHIPPED | OUTPATIENT
Start: 2023-05-16

## 2023-05-16 RX ORDER — HYDROXYZINE PAMOATE 50 MG/1
50-100 CAPSULE ORAL
Qty: 180 CAPSULE | Refills: 3 | Status: SHIPPED | OUTPATIENT
Start: 2023-05-16 | End: 2024-05-10

## 2023-05-16 RX ORDER — SERTRALINE HYDROCHLORIDE 100 MG/1
200 TABLET, FILM COATED ORAL DAILY
Qty: 60 TABLET | Refills: 5 | Status: SHIPPED | OUTPATIENT
Start: 2023-05-16

## 2023-05-16 SDOH — ECONOMIC STABILITY: FOOD INSECURITY: WITHIN THE PAST 12 MONTHS, THE FOOD YOU BOUGHT JUST DIDN'T LAST AND YOU DIDN'T HAVE MONEY TO GET MORE.: NEVER TRUE

## 2023-05-16 SDOH — ECONOMIC STABILITY: HOUSING INSECURITY
IN THE LAST 12 MONTHS, WAS THERE A TIME WHEN YOU DID NOT HAVE A STEADY PLACE TO SLEEP OR SLEPT IN A SHELTER (INCLUDING NOW)?: NO

## 2023-05-16 SDOH — ECONOMIC STABILITY: INCOME INSECURITY: HOW HARD IS IT FOR YOU TO PAY FOR THE VERY BASICS LIKE FOOD, HOUSING, MEDICAL CARE, AND HEATING?: NOT VERY HARD

## 2023-05-16 SDOH — ECONOMIC STABILITY: TRANSPORTATION INSECURITY
IN THE PAST 12 MONTHS, HAS LACK OF TRANSPORTATION KEPT YOU FROM MEETINGS, WORK, OR FROM GETTING THINGS NEEDED FOR DAILY LIVING?: NO

## 2023-05-16 SDOH — ECONOMIC STABILITY: FOOD INSECURITY: WITHIN THE PAST 12 MONTHS, YOU WORRIED THAT YOUR FOOD WOULD RUN OUT BEFORE YOU GOT MONEY TO BUY MORE.: NEVER TRUE

## 2023-05-16 ASSESSMENT — PATIENT HEALTH QUESTIONNAIRE - PHQ9
SUM OF ALL RESPONSES TO PHQ QUESTIONS 1-9: 0
SUM OF ALL RESPONSES TO PHQ QUESTIONS 1-9: 0
1. LITTLE INTEREST OR PLEASURE IN DOING THINGS: 0
SUM OF ALL RESPONSES TO PHQ QUESTIONS 1-9: 0
2. FEELING DOWN, DEPRESSED OR HOPELESS: 0
SUM OF ALL RESPONSES TO PHQ9 QUESTIONS 1 & 2: 0
SUM OF ALL RESPONSES TO PHQ QUESTIONS 1-9: 0

## 2023-05-16 ASSESSMENT — ENCOUNTER SYMPTOMS
BACK PAIN: 0
NAUSEA: 0
SHORTNESS OF BREATH: 0
VOMITING: 0
COUGH: 0

## 2023-05-16 NOTE — PROGRESS NOTES
Jim Ruelas is a 47 y.o. male who was seen by synchronous (real-time) audio-video technology on 5/16/2023. Consent:  Services were provided through a video synchronous discussion virtually to substitute for in-person appointment. He and/or his healthcare decision maker is aware that this patient-initiated Telehealth encounter is a billable service, with coverage as determined by his insurance carrier. He is aware that he may receive a bill and has provided verbal consent to proceed: Yes    I was in the office while conducting this encounter. Subjective:   Jim Ruelas was seen for No chief complaint on file. Hyperlipidemia: NMR lipo profile on 3/2/22 notable for total cholesterol 199, HDL-C 48, LDL-C 134, and triglycerides 93. Pt continues with current medication regimen. Pt continues with Crestor 20mg daily. Anxiety: Pt is on zoloft 200 mg daily. Patient has requested that I fill his medications and that he is seeing a psychiatrist.    He also discussed to begin Vistaril  mg every bedtime. He says that he is stable and has not had any panic attacks. Prior to Admission medications    Medication Sig Start Date End Date Taking? Authorizing Provider   sertraline (ZOLOFT) 100 MG tablet Take 2 tablets by mouth daily 5/16/23  Yes Ananth Leija MD   hydrOXYzine pamoate (VISTARIL) 50 MG capsule Take 1-2 capsules by mouth nightly 5/16/23 5/10/24 Yes Ananth Leija MD   rosuvastatin (CRESTOR) 20 MG tablet TAKE ONE TABLET BY MOUTH ONCE NIGHTLY **DUE FOR OFFICE VISIT/LABS** 5/16/23  Yes Ananth Leija MD   hydrOXYzine pamoate (VISTARIL) 50 MG capsule TAKE 1 CAPSULE BY MOUTH ONCE A DAY  Indications: anxious 6/18/19  Yes Ar Automatic Reconciliation   LORazepam (ATIVAN) 1 MG tablet Take by mouth every 8 hours as needed.  6/18/19  Yes Ar Automatic Reconciliation     Allergies   Allergen Reactions    Shellfish Allergy Swelling     Lips swell     Past Medical History:   Diagnosis Date

## 2023-05-17 ENCOUNTER — TELEPHONE (OUTPATIENT)
Age: 53
End: 2023-05-17

## 2023-05-17 NOTE — TELEPHONE ENCOUNTER
----- Message from Sonia Dumas sent at 5/17/2023  8:27 AM EDT -----  Regarding: FW: physical in 6 months  Yoshi would like pt cpe in 6 months (around 11/16/23), however next available is February 2024. Double book?  Or just schedule pt next available?-TM      ----- Message -----  From: Cady London MD  Sent: 5/16/2023   6:14 PM EDT  To: [place] [id number] [facility] Staff  Subject: physical in 6 months                             Patient needs to have a physical in 6 months please schedule him

## 2023-05-25 ENCOUNTER — TELEPHONE (OUTPATIENT)
Age: 53
End: 2023-05-25